# Patient Record
Sex: FEMALE | Race: BLACK OR AFRICAN AMERICAN | Employment: OTHER | URBAN - METROPOLITAN AREA
[De-identification: names, ages, dates, MRNs, and addresses within clinical notes are randomized per-mention and may not be internally consistent; named-entity substitution may affect disease eponyms.]

---

## 2018-10-11 ENCOUNTER — HOSPITAL ENCOUNTER (INPATIENT)
Age: 63
LOS: 2 days | Discharge: HOME | DRG: 247 | End: 2018-10-13
Attending: EMERGENCY MEDICINE | Admitting: INTERNAL MEDICINE
Payer: COMMERCIAL

## 2018-10-11 DIAGNOSIS — R07.89 ATYPICAL CHEST PAIN: ICD-10-CM

## 2018-10-11 DIAGNOSIS — R77.8 ELEVATED TROPONIN: Primary | ICD-10-CM

## 2018-10-11 DIAGNOSIS — R07.9 CHEST PAIN: ICD-10-CM

## 2018-10-11 LAB
ALBUMIN SERPL-MCNC: 4 G/DL (ref 3.4–5)
ALBUMIN/GLOB SERPL: 1.1 {RATIO} (ref 0.8–1.7)
ALP SERPL-CCNC: 70 U/L (ref 45–117)
ALT SERPL-CCNC: 28 U/L (ref 13–56)
ANION GAP SERPL CALC-SCNC: 7 MMOL/L (ref 3–18)
APTT PPP: 38 SEC (ref 23–36.4)
AST SERPL-CCNC: 71 U/L (ref 15–37)
ATRIAL RATE: 62 BPM
BASOPHILS # BLD: 0 K/UL (ref 0–0.1)
BASOPHILS NFR BLD: 1 % (ref 0–2)
BILIRUB DIRECT SERPL-MCNC: 0.1 MG/DL (ref 0–0.2)
BILIRUB SERPL-MCNC: 0.5 MG/DL (ref 0.2–1)
BUN SERPL-MCNC: 10 MG/DL (ref 7–18)
BUN/CREAT SERPL: 13 (ref 12–20)
CALCIUM SERPL-MCNC: 9.1 MG/DL (ref 8.5–10.1)
CALCULATED P AXIS, ECG09: 40 DEGREES
CALCULATED R AXIS, ECG10: -58 DEGREES
CALCULATED T AXIS, ECG11: 34 DEGREES
CHLORIDE SERPL-SCNC: 102 MMOL/L (ref 100–108)
CK MB CFR SERPL CALC: 12.1 % (ref 0–4)
CK MB SERPL-MCNC: 77.4 NG/ML (ref 5–25)
CK SERPL-CCNC: 638 U/L (ref 26–192)
CO2 SERPL-SCNC: 29 MMOL/L (ref 21–32)
CREAT SERPL-MCNC: 0.78 MG/DL (ref 0.6–1.3)
DIAGNOSIS, 93000: NORMAL
DIFFERENTIAL METHOD BLD: NORMAL
EOSINOPHIL # BLD: 0.1 K/UL (ref 0–0.4)
EOSINOPHIL NFR BLD: 1 % (ref 0–5)
ERYTHROCYTE [DISTWIDTH] IN BLOOD BY AUTOMATED COUNT: 13.4 % (ref 11.6–14.5)
GLOBULIN SER CALC-MCNC: 3.5 G/DL (ref 2–4)
GLUCOSE SERPL-MCNC: 125 MG/DL (ref 74–99)
HCT VFR BLD AUTO: 41 % (ref 35–45)
HGB BLD-MCNC: 13.7 G/DL (ref 12–16)
LIPASE SERPL-CCNC: 92 U/L (ref 73–393)
LYMPHOCYTES # BLD: 1.4 K/UL (ref 0.9–3.6)
LYMPHOCYTES NFR BLD: 23 % (ref 21–52)
MAGNESIUM SERPL-MCNC: 1.6 MG/DL (ref 1.6–2.6)
MCH RBC QN AUTO: 27.2 PG (ref 24–34)
MCHC RBC AUTO-ENTMCNC: 33.4 G/DL (ref 31–37)
MCV RBC AUTO: 81.5 FL (ref 74–97)
MONOCYTES # BLD: 0.4 K/UL (ref 0.05–1.2)
MONOCYTES NFR BLD: 6 % (ref 3–10)
NEUTS SEG # BLD: 4.2 K/UL (ref 1.8–8)
NEUTS SEG NFR BLD: 69 % (ref 40–73)
P-R INTERVAL, ECG05: 216 MS
PLATELET # BLD AUTO: 269 K/UL (ref 135–420)
PMV BLD AUTO: 10.4 FL (ref 9.2–11.8)
POTASSIUM SERPL-SCNC: 3.7 MMOL/L (ref 3.5–5.5)
PROT SERPL-MCNC: 7.5 G/DL (ref 6.4–8.2)
Q-T INTERVAL, ECG07: 444 MS
QRS DURATION, ECG06: 102 MS
QTC CALCULATION (BEZET), ECG08: 450 MS
RBC # BLD AUTO: 5.03 M/UL (ref 4.2–5.3)
SODIUM SERPL-SCNC: 138 MMOL/L (ref 136–145)
TROPONIN I SERPL-MCNC: 0.47 NG/ML (ref 0–0.06)
TROPONIN I SERPL-MCNC: 10.8 NG/ML (ref 0–0.04)
TROPONIN I SERPL-MCNC: 14.5 NG/ML (ref 0–0.04)
VENTRICULAR RATE, ECG03: 62 BPM
WBC # BLD AUTO: 6.1 K/UL (ref 4.6–13.2)

## 2018-10-11 PROCEDURE — 74011250636 HC RX REV CODE- 250/636: Performed by: EMERGENCY MEDICINE

## 2018-10-11 PROCEDURE — 82550 ASSAY OF CK (CPK): CPT | Performed by: PHYSICIAN ASSISTANT

## 2018-10-11 PROCEDURE — 74011250636 HC RX REV CODE- 250/636: Performed by: PHYSICIAN ASSISTANT

## 2018-10-11 PROCEDURE — 84484 ASSAY OF TROPONIN QUANT: CPT | Performed by: EMERGENCY MEDICINE

## 2018-10-11 PROCEDURE — 65660000000 HC RM CCU STEPDOWN

## 2018-10-11 PROCEDURE — 74011250637 HC RX REV CODE- 250/637: Performed by: PHYSICIAN ASSISTANT

## 2018-10-11 PROCEDURE — 80048 BASIC METABOLIC PNL TOTAL CA: CPT | Performed by: EMERGENCY MEDICINE

## 2018-10-11 PROCEDURE — 36415 COLL VENOUS BLD VENIPUNCTURE: CPT | Performed by: PHYSICIAN ASSISTANT

## 2018-10-11 PROCEDURE — 80076 HEPATIC FUNCTION PANEL: CPT | Performed by: PHYSICIAN ASSISTANT

## 2018-10-11 PROCEDURE — 74011250637 HC RX REV CODE- 250/637: Performed by: EMERGENCY MEDICINE

## 2018-10-11 PROCEDURE — 74011250637 HC RX REV CODE- 250/637: Performed by: INTERNAL MEDICINE

## 2018-10-11 PROCEDURE — 93005 ELECTROCARDIOGRAM TRACING: CPT

## 2018-10-11 PROCEDURE — 74011250636 HC RX REV CODE- 250/636: Performed by: INTERNAL MEDICINE

## 2018-10-11 PROCEDURE — 85025 COMPLETE CBC W/AUTO DIFF WBC: CPT | Performed by: EMERGENCY MEDICINE

## 2018-10-11 PROCEDURE — 85730 THROMBOPLASTIN TIME PARTIAL: CPT | Performed by: PHYSICIAN ASSISTANT

## 2018-10-11 PROCEDURE — 74011000250 HC RX REV CODE- 250: Performed by: INTERNAL MEDICINE

## 2018-10-11 PROCEDURE — 74011250637 HC RX REV CODE- 250/637: Performed by: HOSPITALIST

## 2018-10-11 PROCEDURE — 99284 EMERGENCY DEPT VISIT MOD MDM: CPT

## 2018-10-11 PROCEDURE — 83735 ASSAY OF MAGNESIUM: CPT | Performed by: EMERGENCY MEDICINE

## 2018-10-11 PROCEDURE — 83690 ASSAY OF LIPASE: CPT | Performed by: PHYSICIAN ASSISTANT

## 2018-10-11 PROCEDURE — 74011000250 HC RX REV CODE- 250: Performed by: PHYSICIAN ASSISTANT

## 2018-10-11 RX ORDER — ENOXAPARIN SODIUM 100 MG/ML
80 INJECTION SUBCUTANEOUS
Status: COMPLETED | OUTPATIENT
Start: 2018-10-11 | End: 2018-10-11

## 2018-10-11 RX ORDER — GLUCOSAM/CHONDRO/HERB 149/HYAL 750-100 MG
1 TABLET ORAL DAILY
COMMUNITY

## 2018-10-11 RX ORDER — LORAZEPAM 1 MG/1
1 TABLET ORAL
Status: DISCONTINUED | OUTPATIENT
Start: 2018-10-11 | End: 2018-10-13 | Stop reason: HOSPADM

## 2018-10-11 RX ORDER — GUAIFENESIN 100 MG/5ML
81 LIQUID (ML) ORAL DAILY
Status: DISCONTINUED | OUTPATIENT
Start: 2018-10-11 | End: 2018-10-13 | Stop reason: HOSPADM

## 2018-10-11 RX ORDER — ENOXAPARIN SODIUM 100 MG/ML
1 INJECTION SUBCUTANEOUS EVERY 12 HOURS
Status: DISCONTINUED | OUTPATIENT
Start: 2018-10-11 | End: 2018-10-11

## 2018-10-11 RX ORDER — HYDROCHLOROTHIAZIDE 25 MG/1
12.5 TABLET ORAL DAILY
Status: DISCONTINUED | OUTPATIENT
Start: 2018-10-11 | End: 2018-10-13 | Stop reason: HOSPADM

## 2018-10-11 RX ORDER — ONDANSETRON 2 MG/ML
4 INJECTION INTRAMUSCULAR; INTRAVENOUS
Status: DISCONTINUED | OUTPATIENT
Start: 2018-10-11 | End: 2018-10-12

## 2018-10-11 RX ORDER — LOSARTAN POTASSIUM AND HYDROCHLOROTHIAZIDE 12.5; 5 MG/1; MG/1
1 TABLET ORAL DAILY
COMMUNITY
End: 2019-07-22

## 2018-10-11 RX ORDER — GUAIFENESIN 100 MG/5ML
243 LIQUID (ML) ORAL
Status: COMPLETED | OUTPATIENT
Start: 2018-10-11 | End: 2018-10-11

## 2018-10-11 RX ORDER — FAMOTIDINE 20 MG/50ML
20 INJECTION, SOLUTION INTRAVENOUS EVERY 12 HOURS
Status: DISCONTINUED | OUTPATIENT
Start: 2018-10-11 | End: 2018-10-11

## 2018-10-11 RX ORDER — CARVEDILOL 3.12 MG/1
3.12 TABLET ORAL EVERY 12 HOURS
Status: DISCONTINUED | OUTPATIENT
Start: 2018-10-11 | End: 2018-10-11

## 2018-10-11 RX ORDER — HEPARIN SODIUM 10000 [USP'U]/100ML
12-25 INJECTION, SOLUTION INTRAVENOUS
Status: DISCONTINUED | OUTPATIENT
Start: 2018-10-11 | End: 2018-10-12

## 2018-10-11 RX ORDER — ATORVASTATIN CALCIUM 40 MG/1
80 TABLET, FILM COATED ORAL
Status: DISCONTINUED | OUTPATIENT
Start: 2018-10-11 | End: 2018-10-13 | Stop reason: HOSPADM

## 2018-10-11 RX ORDER — ACETAMINOPHEN 325 MG/1
650 TABLET ORAL
Status: DISCONTINUED | OUTPATIENT
Start: 2018-10-11 | End: 2018-10-13

## 2018-10-11 RX ORDER — NITROGLYCERIN 40 MG/100ML
10 INJECTION INTRAVENOUS CONTINUOUS
Status: DISCONTINUED | OUTPATIENT
Start: 2018-10-11 | End: 2018-10-13

## 2018-10-11 RX ORDER — LOSARTAN POTASSIUM 50 MG/1
50 TABLET ORAL DAILY
Status: DISCONTINUED | OUTPATIENT
Start: 2018-10-11 | End: 2018-10-13 | Stop reason: HOSPADM

## 2018-10-11 RX ORDER — HEPARIN SODIUM 1000 [USP'U]/ML
55.1 INJECTION, SOLUTION INTRAVENOUS; SUBCUTANEOUS ONCE
Status: COMPLETED | OUTPATIENT
Start: 2018-10-11 | End: 2018-10-11

## 2018-10-11 RX ORDER — PRAVASTATIN SODIUM 20 MG/1
40 TABLET ORAL
Status: DISCONTINUED | OUTPATIENT
Start: 2018-10-11 | End: 2018-10-11

## 2018-10-11 RX ORDER — METOPROLOL TARTRATE 25 MG/1
25 TABLET, FILM COATED ORAL EVERY 12 HOURS
Status: DISCONTINUED | OUTPATIENT
Start: 2018-10-11 | End: 2018-10-12

## 2018-10-11 RX ORDER — ENOXAPARIN SODIUM 100 MG/ML
1 INJECTION SUBCUTANEOUS EVERY 12 HOURS
Status: DISCONTINUED | OUTPATIENT
Start: 2018-10-12 | End: 2018-10-11

## 2018-10-11 RX ORDER — HYDRALAZINE HYDROCHLORIDE 20 MG/ML
10 INJECTION INTRAMUSCULAR; INTRAVENOUS
Status: DISCONTINUED | OUTPATIENT
Start: 2018-10-11 | End: 2018-10-13 | Stop reason: HOSPADM

## 2018-10-11 RX ORDER — VENLAFAXINE 37.5 MG/1
37.5 TABLET ORAL 3 TIMES DAILY
Status: DISCONTINUED | OUTPATIENT
Start: 2018-10-11 | End: 2018-10-13 | Stop reason: HOSPADM

## 2018-10-11 RX ORDER — GLUCOSAM/CHONDRO/HERB 149/HYAL 750-100 MG
1 TABLET ORAL DAILY
Status: DISCONTINUED | OUTPATIENT
Start: 2018-10-12 | End: 2018-10-13 | Stop reason: HOSPADM

## 2018-10-11 RX ORDER — VENLAFAXINE 37.5 MG/1
35.5 TABLET ORAL DAILY
COMMUNITY

## 2018-10-11 RX ORDER — PRAVASTATIN SODIUM 40 MG/1
40 TABLET ORAL
COMMUNITY
End: 2018-10-13

## 2018-10-11 RX ADMIN — VENLAFAXINE 37.5 MG: 37.5 TABLET ORAL at 18:56

## 2018-10-11 RX ADMIN — VENLAFAXINE 37.5 MG: 37.5 TABLET ORAL at 22:15

## 2018-10-11 RX ADMIN — ASPIRIN 81 MG CHEWABLE TABLET 81 MG: 81 TABLET CHEWABLE at 10:45

## 2018-10-11 RX ADMIN — ATORVASTATIN CALCIUM 80 MG: 40 TABLET, FILM COATED ORAL at 22:15

## 2018-10-11 RX ADMIN — LORAZEPAM 1 MG: 1 TABLET ORAL at 22:24

## 2018-10-11 RX ADMIN — HEPARIN SODIUM AND DEXTROSE 871.2 UNITS/HR: 10000; 5 INJECTION INTRAVENOUS at 21:22

## 2018-10-11 RX ADMIN — HYDROCHLOROTHIAZIDE 12.5 MG: 25 TABLET ORAL at 16:49

## 2018-10-11 RX ADMIN — ONDANSETRON HYDROCHLORIDE 4 MG: 2 SOLUTION INTRAMUSCULAR; INTRAVENOUS at 23:59

## 2018-10-11 RX ADMIN — NITROGLYCERIN 10 MCG/MIN: 40 INJECTION INTRAVENOUS at 22:18

## 2018-10-11 RX ADMIN — LOSARTAN POTASSIUM 50 MG: 50 TABLET ORAL at 16:49

## 2018-10-11 RX ADMIN — HYDRALAZINE HYDROCHLORIDE 10 MG: 20 INJECTION INTRAMUSCULAR; INTRAVENOUS at 19:46

## 2018-10-11 RX ADMIN — HEPARIN SODIUM 4000 UNITS: 1000 INJECTION, SOLUTION INTRAVENOUS; SUBCUTANEOUS at 21:23

## 2018-10-11 RX ADMIN — ASPIRIN 81 MG CHEWABLE TABLET 243 MG: 81 TABLET CHEWABLE at 18:56

## 2018-10-11 RX ADMIN — ENOXAPARIN SODIUM 80 MG: 100 INJECTION SUBCUTANEOUS at 12:36

## 2018-10-11 RX ADMIN — SODIUM CHLORIDE 20 MG: 9 INJECTION INTRAMUSCULAR; INTRAVENOUS; SUBCUTANEOUS at 21:25

## 2018-10-11 RX ADMIN — METOPROLOL TARTRATE 25 MG: 25 TABLET ORAL at 22:20

## 2018-10-11 NOTE — Clinical Note
Right groin and right radial clipped, prepped with ChloraPrep and draped. Wet prep solution applied at: 939. Wet prep solution dried at: 942. Wet prep elapsed drying time: 3 mins.

## 2018-10-11 NOTE — Clinical Note
Lesion 1. Balloon inserted. Balloon inflated using multiple inflations. Lesion 1: Pressure = 8 dionne; Duration = 7 sec. Inflation 2: Pressure = 10 dionne; Duration = 6 sec. Inflation 3: Pressure = 10 dionne; Duration = 4 sec.

## 2018-10-11 NOTE — Clinical Note
Contrast Dose Calculator:  
Patient's age: 59.  
Patient's sex: Female. Patient weight (kg) = 72.3. Creatinine level (mg/dL) = 0.85. Creatinine clearance (mL/min): 78.  
Contrast concentration (mg/mL) = 300. MACD = 300 mL. Max Contrast dose per Creatinine Cl calculator = 175.5 mL.

## 2018-10-11 NOTE — Clinical Note
TRANSFER - IN REPORT:  
 
Verbal report received from: [facility], [facility], RN . Report consisted of patient's Situation, Background, Assessment and  
Recommendations(SBAR). Opportunity for questions and clarification was provided. Assessment completed upon patient's arrival to unit and care assumed. Patient transported with a Cardiac Cath Tech / Patient Care Tech.

## 2018-10-11 NOTE — IP AVS SNAPSHOT
[address] Patient: Roe Harper MRN: [id number] WANDER:[birth year] About your hospitalization You were admitted on:  October 11, 2018 You last received care in the:  [address] [place] [address] You were discharged on:  October 13, 2018 Why you were hospitalized Your primary diagnosis was:  Not on File Your diagnoses also included:  Atypical Chest Pain, Elevated Troponin Follow-up Information Follow up With Details Comments Contact Info Zoë Alfaro MD On 11/1/2018 F/u @ 10:30amwith [facility] [place] [place] [address] [facility] [address] [facility] [address] 
[phone] Ragini Clinton MD On 10/18/2018 F/u @11:30am must be there by 11:15am [address] [facility] [address] [facility] [address] 
[phone] Connie Jain, MD   Patient can only remember the practice name and not the physician Discharge Orders None A check travon indicates which time of day the medication should be taken. My Medications START taking these medications Instructions Each Dose to Equal  
 Morning Noon Evening Bedtime  
 acetaminophen 500 mg tablet Commonly known as:  TYLENOL Take 1 Tab by mouth every six (6) hours as needed. 500 mg  
    
   
   
   
  
 aluminum-magnesium hydroxide 200-200 mg/5 mL suspension Commonly known as:  MAALOX Take 15 mL by mouth three (3) times daily (with meals) for 5 days. 15 mL  
    
  
   
  
   
  
   
  
 aspirin 81 mg chewable tablet Start taking on:  10/14/2018 Take 1 Tab by mouth daily. 81 mg  
    
  
   
   
   
  
 atorvastatin 80 mg tablet Commonly known as:  LIPITOR Take 1 Tab by mouth nightly. 80 mg  
    
   
   
  
   
  
 isosorbide mononitrate ER 30 mg tablet Commonly known as:  IMDUR Start taking on:  10/14/2018 Take 1 Tab by mouth daily.   
 30 mg  
    
  
   
   
   
  
 metoprolol succinate 50 mg XL tablet Commonly known as:  TOPROL-XL Start taking on:  10/14/2018 Take 1 Tab by mouth daily. 50 mg  
    
  
   
   
   
  
 OTHER This is to certify that family member for Peter Patel is currently admitted to [facility] and Danielle Simmons has been here at the hospital on 10/12/2018  Please feel free to contact my office if you have any questions or concerns. Thank you. pantoprazole 40 mg tablet Commonly known as:  PROTONIX Take one tablet by mouth before breakfast and dinner daily for two days an then daily before dinner Before breakfast and dinner for two days, then daily before dinner. polyethylene glycol 17 gram packet Commonly known as:  Hanane Kimberlee Take 1 Packet by mouth daily as needed (constipation). 17 g  
    
   
   
   
  
 ticagrelor 90 mg tablet Commonly known as:  [employer] Take 1 Tab by mouth every twelve (12) hours every twelve (12) hours. 90 mg CONTINUE taking these medications Instructions Each Dose to Equal  
 Morning Noon Evening Bedtime Cetirizine 10 mg Cap Take  by mouth.  
     
   
   
   
  
 losartan-hydroCHLOROthiazide 50-12.5 mg per tablet Commonly known as:  HYZAAR Take 1 Tab by mouth daily. 1 Tab  
    
  
   
   
   
  
 omega 3-DHA-EPA-fish oil 1,000 mg (120 mg-180 mg) capsule Take 1 Cap by mouth daily. 1 Cap  
    
  
   
   
   
  
 venlafaxine 37.5 mg tablet Commonly known as:  [facility] Take 37.5 mg by mouth three (3) times daily. 37.5 mg  
    
  
   
  
   
  
   
  
  
STOP taking these medications   
 pravastatin 40 mg tablet Commonly known as:  PRAVACHOL Where to Get Your Medications Information on where to get these meds will be given to you by the nurse or doctor. ! Ask your nurse or doctor about these medications acetaminophen 500 mg tablet  
 aluminum-magnesium hydroxide 200-200 mg/5 mL suspension  
 aspirin 81 mg chewable tablet  
 atorvastatin 80 mg tablet  
 isosorbide mononitrate ER 30 mg tablet  
 metoprolol succinate 50 mg XL tablet OTHER  
 pantoprazole 40 mg tablet  
 polyethylene glycol 17 gram packet  
 ticagrelor 90 mg tablet Discharge Instructions Chest Pain: Care Instructions Your Care Instructions There are many things that can cause chest pain. Some are not serious and will get better on their own in a few days. But some kinds of chest pain need more testing and treatment. Your doctor may have recommended a follow-up visit in the next 8 to 12 hours. If you are not getting better, you may need more tests or treatment. Even though your doctor has released you, you still need to watch for any problems. The doctor carefully checked you, but sometimes problems can develop later. If you have new symptoms or if your symptoms do not get better, get medical care right away. If you have worse or different chest pain or pressure that lasts more than 5 minutes or you passed out (lost consciousness), call 911 or seek other emergency help right away. A medical visit is only one step in your treatment. Even if you feel better, you still need to do what your doctor recommends, such as going to all suggested follow-up appointments and taking medicines exactly as directed. This will help you recover and help prevent future problems. How can you care for yourself at home? · Rest until you feel better. · Take your medicine exactly as prescribed. Call your doctor if you think you are having a problem with your medicine. · Do not drive after taking a prescription pain medicine. When should you call for help? Call 911 if: 
  · You passed out (lost consciousness).  
  · You have severe difficulty breathing.  
  · You have symptoms of a heart attack. These may include: ¨ Chest pain or pressure, or a strange feeling in your chest. 
¨ Sweating. ¨ Shortness of breath. ¨ Nausea or vomiting. ¨ Pain, pressure, or a strange feeling in your back, neck, jaw, or upper belly or in one or both shoulders or arms. ¨ Lightheadedness or sudden weakness. ¨ A fast or irregular heartbeat. After you call 911, the  may tell you to chew 1 adult-strength or 2 to 4 low-dose aspirin. Wait for an ambulance. Do not try to drive yourself.  
 Call your doctor today if: 
  · You have any trouble breathing.  
  · Your chest pain gets worse.  
  · You are dizzy or lightheaded, or you feel like you may faint.  
  · You are not getting better as expected.  
  · You are having new or different chest pain. Where can you learn more? Go to http://mauricio-pablo.info/. Enter A120 in the search box to learn more about \"Chest Pain: Care Instructions. \" Current as of: November 20, 2017 Content Version: 11.8 © 2662-2080 [employer]. Care instructions adapted under license by [employer] (which disclaims liability or warranty for this information). If you have questions about a medical condition or this instruction, always ask your healthcare professional. [address] any warranty or liability for your use of this information. Introducing [facility]! [facility] introduces [employer] patient portal. Now you can access parts of your medical record, email your doctor's office, and request medication refills online. 1. In your internet browser, go to [email or web address] 2. Click on the First Time User? Click Here link in the Sign In box. You will see the New Member Sign Up page. 3. Enter your [employer] Access Code exactly as it appears below. You will not need to use this code after youve completed the sign-up process. If you do not sign up before the expiration date, you must request a new code. · BrightLine Access Code: [email or web address] Expires: 1/9/2019  9:07 AM 
 
4. Enter the last four digits of your Social Security Number (xxxx) and Date of Birth (mm/dd/yyyy) as indicated and click Submit. You will be taken to the next sign-up page. 5. Create a BrightLine ID. This will be your BrightLine login ID and cannot be changed, so think of one that is secure and easy to remember. 6. Create a BrightLine password. You can change your password at any time. 7. Enter your Password Reset Question and Answer. This can be used at a later time if you forget your password. 8. Enter your e-mail address. You will receive e-mail notification when new information is available in [address] 9. Click Sign Up. You can now view and download portions of your medical record. 10. Click the Download Summary menu link to download a portable copy of your medical information. If you have questions, please visit the Frequently Asked Questions section of the BrightLine website. Remember, BrightLine is NOT to be used for urgent needs. For medical emergencies, dial 911. Now available from your iPhone and Android! Introducing Raj Narvaez As a [facility] patient, I wanted to make you aware of our electronic visit tool called Raj Eduardonitajuan. [facility] 24/7 allows you to connect within minutes with a medical provider 24 hours a day, seven days a week via a mobile device or tablet or logging into a secure website from your computer. You can access Raj Narvaez from anywhere in the [place]. A virtual visit might be right for you when you have a simple condition and feel like you just dont want to get out of bed, or cant get away from work for an appointment, when your regular [facility] provider is not available (evenings, weekends or holidays), or when youre out of town and need minor care.   Electronic visits cost only $49 and if the [facility] 24/7 provider determines a prescription is needed to treat your condition, one can be electronically transmitted to a nearby pharmacy*. Please take a moment to enroll today if you have not already done so. The enrollment process is free and takes just a few minutes. To enroll, please download the [employer] 24/7 murtaza to your tablet or phone, or visit [email or web address] to enroll on your computer. And, as an [address] [place] [address] patient with a [employer] account, the results of your visits will be scanned into your electronic medical record and your primary care provider will be able to view the scanned results. We urge you to continue to see your regular [employer] provider for your ongoing medical care. And while your primary care provider may not be the one available when you seek a List[employer] virtual visit, the peace of mind you get from getting a real diagnosis real time can be priceless. For more information on ListMinut, view our Frequently Asked Questions (FAQs) at [email or web address]. Sincerely, 
 
Marisel Arriaga MD 
Chief Medical Officer [facility] *:  certain medications cannot be prescribed via ListMinut Providers Seen During Your Hospitalization Provider Specialty Primary office phone Liv Simons MD Emergency Medicine [phone] Katherine Trammell MD Internal Medicine [phone] Monica Demarco MD Internal Medicine [phone] Immunizations Administered for This Admission Name Date Influenza Vaccine (Quad) PF  Deferred () Your Primary Care Physician (PCP) Primary Care Physician Office Phone Office Fax OTHER, PHYS ** None ** ** None ** You are allergic to the following No active allergies Recent Documentation Height Weight Breastfeeding? BMI OB Status Smoking Status 1.549 m 71.7 kg No 29.85 kg/m2 Hysterectomy Never Smoker Emergency Contacts Name Discharge Info Relation Home Work Mobile Norma Marie CAREGIVER [3] Spouse [3] [phone] Patient Belongings The following personal items are in your possession at time of discharge: 
  Dental Appliances: None  Visual Aid: None      Home Medications: Sent home   Jewelry: Earrings, Bracelet, Necklace  Clothing: Pants, Undergarments, With patient    Other Valuables: Sent home Please provide this summary of care documentation to your next provider. Signatures-by signing, you are acknowledging that this After Visit Summary has been reviewed with you and you have received a copy. Patient Signature:  ____________________________________________________________ Date:  ____________________________________________________________  
  
[facility] Provider Signature:  ____________________________________________________________ Date:  ____________________________________________________________

## 2018-10-11 NOTE — ED TRIAGE NOTES
Patient c/o onset of epigastric pain today at 0515. She states prior hx of epigastric pain on Monday at approximately same time. Patient appears anxious.

## 2018-10-11 NOTE — ED PROVIDER NOTES
EMERGENCY DEPARTMENT HISTORY AND PHYSICAL EXAM 
 
9:30 AM 
 
 
Date: 10/11/2018 Patient Name: Janak Kulkarni History of Presenting Illness Chief Complaint Patient presents with  Abdominal Pain  
  epigastric History Provided By: Patient Chief Complaint: Abdominal Pain Duration: 3 Days Timing:  Intermittent Location: Generalized Quality: Bloating Severity: Moderate Modifying Factors: Nothing makes the Sx better or worse. Associated Symptoms: neck pain (left sided), vomiting Additional History (Context): Janak Kulkarni is a 59 y.o. female with PMHx of HTN, high cholesterol, headache who presents with intermittent moderate generalized abdominal pain described as a bloating feeling that started x 3 days ago. Associated Sx include neck pain (left sided) and vomiting. Nothing makes the Sx better or worse. Pt states the Sx have subsided at bedside. Reports she ate a sandwich from [employer] x 3 days ago. Denies any further complaints or symptoms at the moment. PCP: Connie Jain MD 
 
Current Facility-Administered Medications Medication Dose Route Frequency Provider Last Rate Last Dose  aspirin chewable tablet 81 mg  81 mg Oral DAILY Dinora Ferrell MD   81 mg at 10/11/18 1045 Current Outpatient Prescriptions Medication Sig Dispense Refill  losartan-hydroCHLOROthiazide (HYZAAR) 50-12.5 mg per tablet Take 1 Tab by mouth daily.  venlafaxine (EFFEXOR) 37.5 mg tablet Take 37.5 mg by mouth three (3) times daily.  omega 3-DHA-EPA-fish oil 1,000 mg (120 mg-180 mg) capsule Take 1 Cap by mouth daily.  pravastatin (PRAVACHOL) 40 mg tablet Take 40 mg by mouth nightly.  Cetirizine 10 mg cap Take  by mouth. Past History Past Medical History: 
Past Medical History:  
Diagnosis Date  Headache  High cholesterol  Hypertension Past Surgical History: 
Past Surgical History:  
Procedure Laterality Date  HX OTHER SURGICAL    
 right fallopian tube removal  
 HX PARTIAL HYSTERECTOMY Family History: 
History reviewed. No pertinent family history. Social History: 
Social History Substance Use Topics  Smoking status: Never Smoker  Smokeless tobacco: Never Used  Alcohol use Yes Comment: occasional use Allergies: 
No Known Allergies Review of Systems Review of Systems Constitutional: Negative for chills, fatigue and fever. HENT: Negative. Negative for sore throat. Eyes: Negative. Respiratory: Negative for cough and shortness of breath. Cardiovascular: Negative for chest pain and palpitations. Gastrointestinal: Positive for abdominal pain and vomiting. Negative for nausea. Genitourinary: Negative for dysuria. Musculoskeletal: Positive for neck pain. Skin: Negative. Neurological: Negative for dizziness, weakness, light-headedness and headaches. Psychiatric/Behavioral: Negative. All other systems reviewed and are negative. Physical Exam  
 
Visit Vitals  BP (!) 194/117 (BP 1 Location: Left arm, BP Patient Position: At rest)  Pulse 68  Temp 98.5 °F (36.9 °C)  Resp 16  
 Ht 5' 1\" (1.549 m)  Wt 72.6 kg (160 lb)  SpO2 100%  BMI 30.23 kg/m2 Physical Exam  
Constitutional: She is oriented to person, place, and time. She appears well-developed and well-nourished. HENT:  
Head: Normocephalic and atraumatic. Mouth/Throat: Oropharynx is clear and moist.  
Eyes: Conjunctivae are normal. Pupils are equal, round, and reactive to light. No scleral icterus. Neck: Normal range of motion. Neck supple. No JVD present. No tracheal deviation present. Cardiovascular: Normal rate, regular rhythm and normal heart sounds. Pulmonary/Chest: Effort normal and breath sounds normal. No respiratory distress. She has no wheezes. Abdominal: Soft. Bowel sounds are normal.  
Musculoskeletal: Normal range of motion. Neurological: She is alert and oriented to person, place, and time. She has normal strength. Gait normal. GCS eye subscore is 4. GCS verbal subscore is 5. GCS motor subscore is 6. Skin: Skin is warm and dry. Psychiatric: She has a normal mood and affect. Nursing note and vitals reviewed. Diagnostic Study Results Labs - Recent Results (from the past 12 hour(s)) EKG, 12 LEAD, INITIAL Collection Time: 10/11/18  9:15 AM  
Result Value Ref Range Ventricular Rate 62 BPM  
 Atrial Rate 62 BPM  
 P-R Interval 216 ms  
 QRS Duration 102 ms Q-T Interval 444 ms QTC Calculation (Bezet) 450 ms Calculated P Axis 40 degrees Calculated R Axis -58 degrees Calculated T Axis 34 degrees Diagnosis Sinus rhythm with 1st degree AV block Incomplete right bundle branch block Left anterior fascicular block Voltage criteria for left ventricular hypertrophy Abnormal ECG No previous ECGs available METABOLIC PANEL, BASIC Collection Time: 10/11/18  9:45 AM  
Result Value Ref Range Sodium 138 136 - 145 mmol/L Potassium 3.7 3.5 - 5.5 mmol/L Chloride 102 100 - 108 mmol/L  
 CO2 29 21 - 32 mmol/L Anion gap 7 3.0 - 18 mmol/L Glucose 125 (H) 74 - 99 mg/dL BUN 10 7.0 - 18 MG/DL Creatinine 0.78 0.6 - 1.3 MG/DL  
 BUN/Creatinine ratio 13 12 - 20 GFR est AA >60 >60 ml/min/1.73m2 GFR est non-AA >60 >60 ml/min/1.73m2 Calcium 9.1 8.5 - 10.1 MG/DL  
CBC WITH AUTOMATED DIFF Collection Time: 10/11/18  9:45 AM  
Result Value Ref Range WBC 6.1 4.6 - 13.2 K/uL  
 RBC 5.03 4.20 - 5.30 M/uL  
 HGB 13.7 12.0 - 16.0 g/dL HCT 41.0 35.0 - 45.0 % MCV 81.5 74.0 - 97.0 FL  
 MCH 27.2 24.0 - 34.0 PG  
 MCHC 33.4 31.0 - 37.0 g/dL  
 RDW 13.4 11.6 - 14.5 % PLATELET 711 785 - 641 K/uL MPV 10.4 9.2 - 11.8 FL  
 NEUTROPHILS 69 40 - 73 % LYMPHOCYTES 23 21 - 52 % MONOCYTES 6 3 - 10 % EOSINOPHILS 1 0 - 5 % BASOPHILS 1 0 - 2 % ABS. NEUTROPHILS 4.2 1.8 - 8.0 K/UL  
 ABS. LYMPHOCYTES 1.4 0.9 - 3.6 K/UL  
 ABS. MONOCYTES 0.4 0.05 - 1.2 K/UL  
 ABS. EOSINOPHILS 0.1 0.0 - 0.4 K/UL  
 ABS. BASOPHILS 0.0 0.0 - 0.1 K/UL  
 DF AUTOMATED    
TROPONIN I Collection Time: 10/11/18  9:45 AM  
Result Value Ref Range Troponin-I, Qt. 0.47 (H) 0.00 - 0.06 NG/ML  
MAGNESIUM Collection Time: 10/11/18  9:45 AM  
Result Value Ref Range Magnesium 1.6 1.6 - 2.6 mg/dL Radiologic Studies - No orders to display Medical Decision Making I am the first provider for this patient. I reviewed the vital signs, available nursing notes, past medical history, past surgical history, family history and social history. Vital Signs-Reviewed the patient's vital signs. Pulse Oximetry Analysis - 100 % on RA, normal  
 
EKG: Interpreted by the EP. Time Interpreted: 9:15 AM  
 Rate: 62 bpm  
 Rhythm: NSR Interpretation: no acute changes Records Reviewed: Nursing Notes (Time of Review: 9:30 AM) ED Course: Progress Notes, Reevaluation, and Consults: 
 
11:40 AM Consult: I discussed care with Dr. Beverly Lynn (Hospitalist). It was a standard discussion including patient history, chief complaint, available diagnostic results, and predicted treatment course. Agrees to accept pt for admission. Provider Notes (Medical Decision Making):  
 
 
Diagnosis Clinical Impression: No diagnosis found. Disposition: admitted. Follow-up Information None Patient's Medications Start Taking No medications on file Continue Taking CETIRIZINE 10 MG CAP    Take  by mouth. LOSARTAN-HYDROCHLOROTHIAZIDE (HYZAAR) 50-12.5 MG PER TABLET    Take 1 Tab by mouth daily. OMEGA 3-DHA-EPA-FISH OIL 1,000 MG (120 MG-180 MG) CAPSULE    Take 1 Cap by mouth daily. PRAVASTATIN (PRAVACHOL) 40 MG TABLET    Take 40 mg by mouth nightly. VENLAFAXINE (EFFEXOR) 37.5 MG TABLET    Take 37.5 mg by mouth three (3) times daily. These Medications have changed No medications on file Stop Taking No medications on file  
 
_______________________________ Attestations: 
Scribe Attestation Frankie Solorzano (Aj) acting as a scribe for and in the presence of Oracio Bearden MD     
October 11, 2018 at 9:30 AM 
    
Provider Attestation:     
I personally performed the services described in the documentation, reviewed the documentation, as recorded by the scribe in my presence, and it accurately and completely records my words and actions.  October 11, 2018 at 9:30 AM - Oracio Bearden MD   
_______________________________ 
 
PT resting comfortably, remains asymptomatic, results reviewed with her and  and they agree with admit to [facility] for further Ivania Powell MD 
11:57 AM

## 2018-10-11 NOTE — Clinical Note
TRANSFER - OUT REPORT:  
 
Verbal report given to: Irma Charles RN. Report consisted of patient's Situation, Background, Assessment and  
Recommendations(SBAR). Opportunity for questions and clarification was provided. Patient transported with a Cardiac Cath Tech / Patient Care Tech. Patient transported to: [address].

## 2018-10-11 NOTE — PROGRESS NOTES
Critical troponin lab noted for patient. Doctor Jaclyn Black called and message left with his office . Dr. Carmen Maldonado paged about patient lab result. Spoke to the PA with Cardiology about the labs. New orders entered for patient by the PA. Spoke to pharmacy to clarify the baby aspirin order. Pharmacist paged the PA to clarify.

## 2018-10-11 NOTE — H&P
History and Physical 
NAME:  Dawit Jimenez :   1955 MRN:   [id number] Date/Time:  10/11/2018 CHIEF COMPLAINT: epigastric pain HISTORY OF PRESENT ILLNESS:    
Ms. Elsa Wong is a 59 y.o.   female with a PMH of HTN, hyperlipidemia who presents with c/c of epigastric and lower chest pain. she feels indigestion earlier. She went to [facility] ED. Her labs showed troponin of 0.47. EKG is ok. Cardiology was consulted and transferred to [facility] for further evaluation and management. She denies palpitation or shortness of breathing. Denies cough or fever. She denies any prior cardiac problems. No previous stress test or cardiac evaluation. Past Medical History:  
Diagnosis Date  Headache  High cholesterol  Hypertension Past Surgical History:  
Procedure Laterality Date  HX OTHER SURGICAL    
 right fallopian tube removal  
 HX PARTIAL HYSTERECTOMY Social History Substance Use Topics  Smoking status: Never Smoker  Smokeless tobacco: Never Used  Alcohol use Yes Comment: occasional use History reviewed. No pertinent family history. No Known Allergies Prior to Admission medications Medication Sig Start Date End Date Taking? Authorizing Provider  
losartan-hydroCHLOROthiazide (HYZAAR) 50-12.5 mg per tablet Take 1 Tab by mouth daily. Yes Connie Jain MD  
venlafaxine (EFFEXOR) 37.5 mg tablet Take 37.5 mg by mouth three (3) times daily. Yes Connie Jain MD  
omega 3-DHA-EPA-fish oil 1,000 mg (120 mg-180 mg) capsule Take 1 Cap by mouth daily. Yes Connie Jain MD  
pravastatin (PRAVACHOL) 40 mg tablet Take 40 mg by mouth nightly. Connie Jain MD  
Cetirizine 10 mg cap Take  by mouth. Yes Connie Jain MD  
 
 
REVIEW OF SYSTEMS:  
 
CONSTITUTIONAL: No Fever, No chills, No weight loss, No Night sweats HEENT:  No epistaxis, No diff in swallowing CVS: chest pain, No palpitations, No syncope, No peripheral edema, No PND, No orthopnea RS: No shortness of breath, No cough, No hemoptysis, No pleuritic chest pain GI: abd pain, No vomitting, No diarrhea, No hematemesis, No rectal bleeding, No acid reflux or heartburn NEURO: No focal weakness, No headaches, No seizures PSYCH: No anxiety, No depression MUSCULOSKLETAL: No joint pain or swelling : No hematuria or dysuria SKIN: No rash Physical Exam: VITALS:   
Vital signs reviewed; most recent are: 
 
Visit Vitals  BP (!) 175/97 (BP 1 Location: Right arm, BP Patient Position: At rest)  Pulse 62  Temp 98.5 °F (36.9 °C)  Resp 16  
 Ht 5' 1\" (1.549 m)  Wt 72.6 kg (160 lb)  SpO2 99%  BMI 30.23 kg/m2 SpO2 Readings from Last 6 Encounters:  
10/11/18 99% No intake or output data in the 24 hours ending 10/11/18 1619 GENERAL: Not in acute distress HEENT: pink conjunctiva, un icteric sclera, NECK: No lymphadenopthy or thyroid swelling, JVD not seen LYMPH: No supraclavicular or cervical or axillary nodes on both sides CVS: S1S2, No murmurs, No gallop or rub RS: CTA, No wheezing or crackles Abd: Soft, non tender, not distended, No guarding, No rigidity NEURO:  No focal neurologic deficits Extrm: no leg edema or swelling Skin: No rash Labs: 
Recent Results (from the past 24 hour(s)) EKG, 12 LEAD, INITIAL Collection Time: 10/11/18  9:15 AM  
Result Value Ref Range Ventricular Rate 62 BPM  
 Atrial Rate 62 BPM  
 P-R Interval 216 ms  
 QRS Duration 102 ms Q-T Interval 444 ms QTC Calculation (Bezet) 450 ms Calculated P Axis 40 degrees Calculated R Axis -58 degrees Calculated T Axis 34 degrees Diagnosis Sinus rhythm with 1st degree AV block Incomplete right bundle branch block Left anterior fascicular block Voltage criteria for left ventricular hypertrophy Abnormal ECG No previous ECGs available METABOLIC PANEL, BASIC  
 Collection Time: 10/11/18  9:45 AM  
Result Value Ref Range Sodium 138 136 - 145 mmol/L Potassium 3.7 3.5 - 5.5 mmol/L Chloride 102 100 - 108 mmol/L  
 CO2 29 21 - 32 mmol/L Anion gap 7 3.0 - 18 mmol/L Glucose 125 (H) 74 - 99 mg/dL BUN 10 7.0 - 18 MG/DL Creatinine 0.78 0.6 - 1.3 MG/DL  
 BUN/Creatinine ratio 13 12 - 20 GFR est AA >60 >60 ml/min/1.73m2 GFR est non-AA >60 >60 ml/min/1.73m2 Calcium 9.1 8.5 - 10.1 MG/DL  
CBC WITH AUTOMATED DIFF Collection Time: 10/11/18  9:45 AM  
Result Value Ref Range WBC 6.1 4.6 - 13.2 K/uL  
 RBC 5.03 4.20 - 5.30 M/uL  
 HGB 13.7 12.0 - 16.0 g/dL HCT 41.0 35.0 - 45.0 % MCV 81.5 74.0 - 97.0 FL  
 MCH 27.2 24.0 - 34.0 PG  
 MCHC 33.4 31.0 - 37.0 g/dL  
 RDW 13.4 11.6 - 14.5 % PLATELET 487 835 - 877 K/uL MPV 10.4 9.2 - 11.8 FL  
 NEUTROPHILS 69 40 - 73 % LYMPHOCYTES 23 21 - 52 % MONOCYTES 6 3 - 10 % EOSINOPHILS 1 0 - 5 % BASOPHILS 1 0 - 2 %  
 ABS. NEUTROPHILS 4.2 1.8 - 8.0 K/UL  
 ABS. LYMPHOCYTES 1.4 0.9 - 3.6 K/UL  
 ABS. MONOCYTES 0.4 0.05 - 1.2 K/UL  
 ABS. EOSINOPHILS 0.1 0.0 - 0.4 K/UL  
 ABS. BASOPHILS 0.0 0.0 - 0.1 K/UL  
 DF AUTOMATED    
TROPONIN I Collection Time: 10/11/18  9:45 AM  
Result Value Ref Range Troponin-I, Qt. 0.47 (H) 0.00 - 0.06 NG/ML  
MAGNESIUM Collection Time: 10/11/18  9:45 AM  
Result Value Ref Range Magnesium 1.6 1.6 - 2.6 mg/dL Active Problems: 
  Atypical chest pain (10/11/2018) Elevated troponin (10/11/2018) Assessment: 1. Chest pain 2. Epigastric pain 3. Elevated troponin 4. Hypertensive urgency 5. Hyperlipidemia Plan: · Admit to telemetry unit · Serial cardiac enzymes, EKGs · Cardiology already consuted · Continue ASA, statin, lovenox and ARB. add BB · Full code · D/w patient and family Total time:  57 minutes 
 
        
_______________________________________________________________________ Attending Physician:  Catalino Quintero MD  
 
 
Copies: Aileen Mancini MD

## 2018-10-11 NOTE — Clinical Note
Lesion 1: Stent deployed using reinflated balloon.  Lesion 1: Pressure = 15 dionne; Duration = 7 sec

## 2018-10-11 NOTE — CONSULTS
[facility] - Consult Note    Consultation request by Dr. Roberto Lugo for advice/opinion related to evaluating indeterminate troponin    Date of  Admission: 10/11/2018  9:08 AM   Primary Care Physician:  Connie Jain MD     The patient was seen, examined, and independently evaluated and I agree with the below assessment and plan by Eze Johnson PA-C with the following comments. This lady had an episode of left neck pain as well as pain across her shoulders and in the epigastrium which awoke her from sleep in 2 days ago and again the morning at about 5 AM. She took some herbal tea the first time it happened and had some eructation with the discomfort resolving after about 3-4 hours. She had the same pain this morning prompting her to come to the ER and apparently had the discomfort for a few hours before it was relieved. She denies any other cardiovascular complaints, but her blood pressure was quite elevated when she initially came to the ER in the 190's/110-120 range. Her EKG initially showed nothing acute, but her initial troponin was elevated at 0.47. Agree with Lovenox therapy and following serial EKG's and enzymes with cath vs. Pharm NUC pending her serial testing and clinical course. She does have a high pitched right carotid bruit, so I will also get carotid Dopplers on this lady. Assessment:     -Pt presented due to left-sided neck pain with V x 1 and epigastric pain  -Hypertensive urgency, /117 on presentation, has not received any HTN medications today  -Indeterminate troponin x 1 at 0.47  -Hyperlipidemia, prior intolerance to Pravastatin     Plan:     Pt presented with left-sided neck tightness with epigastric pain and vomiting x 1 this AM.  BP elevated in 190's/110's, has not had HTN medication today.  -Will order pt's home HTN medications to start now. -Will order repeat troponin x 2.   Initial troponin 0.47 but could be related to demand ischemia in setting of markedly elevated BP in 190's/110's. -Will order hepatic function panel and lipase.  -Echocardiogram ordered.  -Pt reports familial hypercholesterolemia, will check fasting lipid panel tomorrow morning. Will check A1c as well. -With indeterminate troponin and risk factors of HTN and hyperlipidemia, will make NPO after midnight for stress test in the morning unless enzymes rise. History of Present Illness: This is a 59 y.o. female admitted for Elevated troponin  Atypical chest pain. Patient complains of:  Epigastric pain, V, neck pain    Yuri Douglas is a 59 y.o. female with Hx HTN, hyperlipidemia who presented to the hospital due to left-sided neck tightness along with epigastric pain and vomiting x 1 that awakened her around 03:30 this morning. She also noted some dryness to her throat. She states that she had an episode 2 days ago around the same time where she was awakened by neck pain and belching. She notes that she has been experiencing some abdominal bloating after eating, which is unchanged. She denies any recent exertional chest pain or dyspnea and states that she has helped paint two rooms recently. She denies any orthopnea. No leg swelling.        Cardiac risk factors: dyslipidemia, hypertension      Review of Symptoms:  Except as stated above include:  Constitutional:  negative  Respiratory:  negative  Cardiovascular:  No exertional chest pain/dyspnea or leg swelling  Gastrointestinal: As per HPI  Genitourinary:  negative  Musculoskeletal:  + left-sided neck tightness  Neurological:  Negative  Dermatological:  Negative  Endocrinological: Negative  Psychological:  Negative       Past Medical History:     Past Medical History:   Diagnosis Date    Headache     High cholesterol     Hypertension          Social History:     Social History     Social History    Marital status: SINGLE     Spouse name: N/A    Number of children: N/A    Years of education: N/A     Social History Main Topics  Smoking status: Never Smoker    Smokeless tobacco: Never Used    Alcohol use Yes      Comment: occasional use    Drug use: No    Sexual activity: Not Asked     Other Topics Concern    None     Social History Narrative    None        Family History:   History reviewed. No pertinent family history. Medications:   No Known Allergies     Current Facility-Administered Medications   Medication Dose Route Frequency    aspirin chewable tablet 81 mg  81 mg Oral DAILY         Physical Exam:     Visit Vitals    /85    Pulse 66    Temp 98.5 °F (36.9 °C)    Resp 14    Ht 5' 1\" (1.549 m)    Wt 160 lb (72.6 kg)    SpO2 100%    BMI 30.23 kg/m2     BP Readings from Last 3 Encounters:   10/11/18 184/85     Pulse Readings from Last 3 Encounters:   10/11/18 66     Wt Readings from Last 3 Encounters:   10/11/18 160 lb (72.6 kg)       General:  alert, cooperative, no distress, appears stated age  Neck:  No JVD. High grade right carotid bruit  Lungs:  clear to auscultation bilaterally  Heart:  Regular rate and rhythm  Abdomen:  abdomen is soft with mild epigastric tenderness  Extremities:  no edema  Skin: Warm and dry.    Neuro: alert, oriented x3, affect appropriate, no focal neurological deficits, moves all extremities well, no involuntary movements  Psych: non focal     Data Review:     Recent Labs      10/11/18   0945   WBC  6.1   HGB  13.7   HCT  41.0   PLT  269     Recent Labs      10/11/18   0945   NA  138   K  3.7   CL  102   CO2  29   GLU  125*   BUN  10   CREA  0.78   CA  9.1   MG  1.6       Results for orders placed or performed during the hospital encounter of 10/11/18   EKG, 12 LEAD, INITIAL   Result Value Ref Range    Ventricular Rate 62 BPM    Atrial Rate 62 BPM    P-R Interval 216 ms    QRS Duration 102 ms    Q-T Interval 444 ms    QTC Calculation (Bezet) 450 ms    Calculated P Axis 40 degrees    Calculated R Axis -58 degrees    Calculated T Axis 34 degrees    Diagnosis       Sinus rhythm with 1st degree AV block  Incomplete right bundle branch block  Left anterior fascicular block  Voltage criteria for left ventricular hypertrophy  Abnormal ECG  No previous ECGs available         All Cardiac Markers in the last 24 hours:    Lab Results   Component Value Date/Time    TROIQ 0.47 (H) 10/11/2018 09:45 AM         Signed By: Julieth Freeman PA-C     October 11, 2018

## 2018-10-12 ENCOUNTER — APPOINTMENT (OUTPATIENT)
Dept: VASCULAR SURGERY | Age: 63
DRG: 247 | End: 2018-10-12
Attending: INTERNAL MEDICINE
Payer: COMMERCIAL

## 2018-10-12 LAB
ANION GAP SERPL CALC-SCNC: 8 MMOL/L (ref 3–18)
APTT PPP: 83.3 SEC (ref 23–36.4)
ATRIAL RATE: 63 BPM
BASOPHILS # BLD: 0 K/UL (ref 0–0.1)
BASOPHILS NFR BLD: 0 % (ref 0–2)
BUN SERPL-MCNC: 8 MG/DL (ref 7–18)
BUN/CREAT SERPL: 9 (ref 12–20)
CALCIUM SERPL-MCNC: 9.1 MG/DL (ref 8.5–10.1)
CALCULATED P AXIS, ECG09: 47 DEGREES
CALCULATED R AXIS, ECG10: -62 DEGREES
CALCULATED T AXIS, ECG11: -67 DEGREES
CHLORIDE SERPL-SCNC: 104 MMOL/L (ref 100–108)
CHOLEST SERPL-MCNC: 339 MG/DL
CO2 SERPL-SCNC: 26 MMOL/L (ref 21–32)
CREAT SERPL-MCNC: 0.85 MG/DL (ref 0.6–1.3)
DIAGNOSIS, 93000: NORMAL
DIFFERENTIAL METHOD BLD: NORMAL
END DIASTOLIC PRESSURE: 14
EOSINOPHIL # BLD: 0 K/UL (ref 0–0.4)
EOSINOPHIL NFR BLD: 1 % (ref 0–5)
ERYTHROCYTE [DISTWIDTH] IN BLOOD BY AUTOMATED COUNT: 13.2 % (ref 11.6–14.5)
EST. AVERAGE GLUCOSE BLD GHB EST-MCNC: 126 MG/DL
GLUCOSE SERPL-MCNC: 133 MG/DL (ref 74–99)
HBA1C MFR BLD: 6 % (ref 4.2–5.6)
HCT VFR BLD AUTO: 38.9 % (ref 35–45)
HDLC SERPL-MCNC: 78 MG/DL (ref 40–60)
HDLC SERPL: 4.3 {RATIO} (ref 0–5)
HGB BLD-MCNC: 13.3 G/DL (ref 12–16)
LDLC SERPL CALC-MCNC: 236.4 MG/DL (ref 0–100)
LEFT CCA DIST DIAS: 15.4 CM/S
LEFT CCA DIST SYS: 51.64 CM/S
LEFT CCA MID DIAS: 15.4 CM/S
LEFT CCA MID SYS: 59.4 CM/S
LEFT CCA PROX DIAS: 22.99 CM/S
LEFT CCA PROX SYS: 111.26 CM/S
LEFT ECA DIAS: 7.69 CM/S
LEFT ECA SYS: 131.86 CM/S
LEFT ICA DIST DIAS: 19.1 CM/S
LEFT ICA DIST SYS: 42.66 CM/S
LEFT ICA MID DIAS: 36.1 CM/S
LEFT ICA MID SYS: 68.46 CM/S
LEFT ICA PROX DIAS: 22.59 CM/S
LEFT ICA PROX SYS: 59.23 CM/S
LEFT OUTFLOW VESSEL EDV: 0 CM/S
LEFT SUBCLAVIAN SYS: 94 CM/S
LEFT VERTEBRAL DIAS: 14.74 CM/S
LEFT VERTEBRAL SYS: 43.53 CM/S
LIPID PROFILE,FLP: ABNORMAL
LYMPHOCYTES # BLD: 2.2 K/UL (ref 0.9–3.6)
LYMPHOCYTES NFR BLD: 27 % (ref 21–52)
MCH RBC QN AUTO: 26.8 PG (ref 24–34)
MCHC RBC AUTO-ENTMCNC: 34.2 G/DL (ref 31–37)
MCV RBC AUTO: 78.3 FL (ref 74–97)
MONOCYTES # BLD: 0.6 K/UL (ref 0.05–1.2)
MONOCYTES NFR BLD: 7 % (ref 3–10)
NEUTS SEG # BLD: 5.3 K/UL (ref 1.8–8)
NEUTS SEG NFR BLD: 65 % (ref 40–73)
P-R INTERVAL, ECG05: 200 MS
PLATELET # BLD AUTO: 340 K/UL (ref 135–420)
PMV BLD AUTO: 10 FL (ref 9.2–11.8)
POTASSIUM SERPL-SCNC: 3.6 MMOL/L (ref 3.5–5.5)
Q-T INTERVAL, ECG07: 500 MS
QRS DURATION, ECG06: 100 MS
QTC CALCULATION (BEZET), ECG08: 511 MS
RBC # BLD AUTO: 4.97 M/UL (ref 4.2–5.3)
RIGHT CCA DIST DIAS: 20.58 CM/S
RIGHT CCA DIST SYS: 72.34 CM/S
RIGHT CCA MID DIAS: 17.99 CM/S
RIGHT CCA MID SYS: 68.46 CM/S
RIGHT CCA PROX DIAS: 19.28 CM/S
RIGHT CCA PROX SYS: 76.22 CM/S
RIGHT ECA DIAS: 22.76 CM/S
RIGHT ECA SYS: 145.51 CM/S
RIGHT ICA DIST DIAS: 34.2 CM/S
RIGHT ICA DIST SYS: 70.28 CM/S
RIGHT ICA MID DIAS: 53.1 CM/S
RIGHT ICA MID SYS: 114.96 CM/S
RIGHT ICA PROX DIAS: 25.65 CM/S
RIGHT ICA PROX SYS: 60.09 CM/S
RIGHT SUBCLAVIAN SYS: 98 CM/S
RIGHT VERTEBRAL DIAS: 15.35 CM/S
RIGHT VERTEBRAL SYS: 51.96 CM/S
SODIUM SERPL-SCNC: 138 MMOL/L (ref 136–145)
TRIGL SERPL-MCNC: 123 MG/DL (ref ?–150)
VENTRICULAR RATE, ECG03: 63 BPM
VLDLC SERPL CALC-MCNC: 24.6 MG/DL
WBC # BLD AUTO: 8.1 K/UL (ref 4.6–13.2)

## 2018-10-12 PROCEDURE — B2151ZZ FLUOROSCOPY OF LEFT HEART USING LOW OSMOLAR CONTRAST: ICD-10-PCS | Performed by: INTERNAL MEDICINE

## 2018-10-12 PROCEDURE — C1894 INTRO/SHEATH, NON-LASER: HCPCS | Performed by: INTERNAL MEDICINE

## 2018-10-12 PROCEDURE — 74011250637 HC RX REV CODE- 250/637: Performed by: INTERNAL MEDICINE

## 2018-10-12 PROCEDURE — 74011250637 HC RX REV CODE- 250/637: Performed by: PHYSICIAN ASSISTANT

## 2018-10-12 PROCEDURE — 77030015766: Performed by: INTERNAL MEDICINE

## 2018-10-12 PROCEDURE — 85025 COMPLETE CBC W/AUTO DIFF WBC: CPT | Performed by: INTERNAL MEDICINE

## 2018-10-12 PROCEDURE — 65660000000 HC RM CCU STEPDOWN

## 2018-10-12 PROCEDURE — 74011250636 HC RX REV CODE- 250/636: Performed by: HOSPITALIST

## 2018-10-12 PROCEDURE — 74011250637 HC RX REV CODE- 250/637: Performed by: HOSPITALIST

## 2018-10-12 PROCEDURE — 74011000250 HC RX REV CODE- 250: Performed by: INTERNAL MEDICINE

## 2018-10-12 PROCEDURE — 93458 L HRT ARTERY/VENTRICLE ANGIO: CPT | Performed by: INTERNAL MEDICINE

## 2018-10-12 PROCEDURE — 80061 LIPID PANEL: CPT | Performed by: INTERNAL MEDICINE

## 2018-10-12 PROCEDURE — 77030013797 HC KT TRNSDUC PRSSR EDWD -A: Performed by: INTERNAL MEDICINE

## 2018-10-12 PROCEDURE — C1725 CATH, TRANSLUMIN NON-LASER: HCPCS | Performed by: INTERNAL MEDICINE

## 2018-10-12 PROCEDURE — 74011636320 HC RX REV CODE- 636/320: Performed by: INTERNAL MEDICINE

## 2018-10-12 PROCEDURE — C1887 CATHETER, GUIDING: HCPCS | Performed by: INTERNAL MEDICINE

## 2018-10-12 PROCEDURE — 74011250636 HC RX REV CODE- 250/636

## 2018-10-12 PROCEDURE — 74011250636 HC RX REV CODE- 250/636: Performed by: INTERNAL MEDICINE

## 2018-10-12 PROCEDURE — 36415 COLL VENOUS BLD VENIPUNCTURE: CPT | Performed by: INTERNAL MEDICINE

## 2018-10-12 PROCEDURE — 83036 HEMOGLOBIN GLYCOSYLATED A1C: CPT | Performed by: PHYSICIAN ASSISTANT

## 2018-10-12 PROCEDURE — 99153 MOD SED SAME PHYS/QHP EA: CPT | Performed by: INTERNAL MEDICINE

## 2018-10-12 PROCEDURE — 85730 THROMBOPLASTIN TIME PARTIAL: CPT | Performed by: PHYSICIAN ASSISTANT

## 2018-10-12 PROCEDURE — 93880 EXTRACRANIAL BILAT STUDY: CPT

## 2018-10-12 PROCEDURE — B2111ZZ FLUOROSCOPY OF MULTIPLE CORONARY ARTERIES USING LOW OSMOLAR CONTRAST: ICD-10-PCS | Performed by: INTERNAL MEDICINE

## 2018-10-12 PROCEDURE — 93005 ELECTROCARDIOGRAM TRACING: CPT

## 2018-10-12 PROCEDURE — 80048 BASIC METABOLIC PNL TOTAL CA: CPT | Performed by: INTERNAL MEDICINE

## 2018-10-12 PROCEDURE — 4A023N7 MEASUREMENT OF CARDIAC SAMPLING AND PRESSURE, LEFT HEART, PERCUTANEOUS APPROACH: ICD-10-PCS | Performed by: INTERNAL MEDICINE

## 2018-10-12 PROCEDURE — C1769 GUIDE WIRE: HCPCS | Performed by: INTERNAL MEDICINE

## 2018-10-12 PROCEDURE — 74011250637 HC RX REV CODE- 250/637: Performed by: NURSE PRACTITIONER

## 2018-10-12 PROCEDURE — 92928 PRQ TCAT PLMT NTRAC ST 1 LES: CPT | Performed by: INTERNAL MEDICINE

## 2018-10-12 PROCEDURE — 99152 MOD SED SAME PHYS/QHP 5/>YRS: CPT | Performed by: INTERNAL MEDICINE

## 2018-10-12 PROCEDURE — 74011250637 HC RX REV CODE- 250/637

## 2018-10-12 PROCEDURE — 74011000258 HC RX REV CODE- 258: Performed by: INTERNAL MEDICINE

## 2018-10-12 PROCEDURE — C1874 STENT, COATED/COV W/DEL SYS: HCPCS | Performed by: INTERNAL MEDICINE

## 2018-10-12 PROCEDURE — 027034Z DILATION OF CORONARY ARTERY, ONE ARTERY WITH DRUG-ELUTING INTRALUMINAL DEVICE, PERCUTANEOUS APPROACH: ICD-10-PCS | Performed by: INTERNAL MEDICINE

## 2018-10-12 DEVICE — XIENCE ALPINE EVEROLIMUS ELUTING CORONARY STENT SYSTEM 2.75 MM X 15 MM / RAPID-EXCHANGE
Type: IMPLANTABLE DEVICE | Status: FUNCTIONAL
Brand: XIENCE ALPINE

## 2018-10-12 RX ORDER — MIDAZOLAM HYDROCHLORIDE 1 MG/ML
INJECTION, SOLUTION INTRAMUSCULAR; INTRAVENOUS
Status: DISPENSED
Start: 2018-10-12 | End: 2018-10-12

## 2018-10-12 RX ORDER — BISACODYL 5 MG
5 TABLET, DELAYED RELEASE (ENTERIC COATED) ORAL
Status: COMPLETED | OUTPATIENT
Start: 2018-10-12 | End: 2018-10-12

## 2018-10-12 RX ORDER — SODIUM CHLORIDE 0.9 % (FLUSH) 0.9 %
5-10 SYRINGE (ML) INJECTION EVERY 8 HOURS
Status: DISCONTINUED | OUTPATIENT
Start: 2018-10-12 | End: 2018-10-13 | Stop reason: HOSPADM

## 2018-10-12 RX ORDER — ISOSORBIDE MONONITRATE 30 MG/1
30 TABLET, EXTENDED RELEASE ORAL DAILY
Status: DISCONTINUED | OUTPATIENT
Start: 2018-10-13 | End: 2018-10-13 | Stop reason: HOSPADM

## 2018-10-12 RX ORDER — ONDANSETRON 2 MG/ML
4 INJECTION INTRAMUSCULAR; INTRAVENOUS
Status: DISCONTINUED | OUTPATIENT
Start: 2018-10-12 | End: 2018-10-13 | Stop reason: HOSPADM

## 2018-10-12 RX ORDER — GUAIFENESIN 100 MG/5ML
LIQUID (ML) ORAL AS NEEDED
Status: DISCONTINUED | OUTPATIENT
Start: 2018-10-12 | End: 2018-10-12 | Stop reason: HOSPADM

## 2018-10-12 RX ORDER — VERAPAMIL HYDROCHLORIDE 2.5 MG/ML
INJECTION, SOLUTION INTRAVENOUS AS NEEDED
Status: DISCONTINUED | OUTPATIENT
Start: 2018-10-12 | End: 2018-10-12 | Stop reason: HOSPADM

## 2018-10-12 RX ORDER — GUAIFENESIN 100 MG/5ML
LIQUID (ML) ORAL
Status: DISPENSED
Start: 2018-10-12 | End: 2018-10-12

## 2018-10-12 RX ORDER — MIDAZOLAM HYDROCHLORIDE 1 MG/ML
INJECTION, SOLUTION INTRAMUSCULAR; INTRAVENOUS AS NEEDED
Status: DISCONTINUED | OUTPATIENT
Start: 2018-10-12 | End: 2018-10-12 | Stop reason: HOSPADM

## 2018-10-12 RX ORDER — ATROPINE SULFATE 0.1 MG/ML
INJECTION INTRAVENOUS
Status: DISPENSED
Start: 2018-10-12 | End: 2018-10-12

## 2018-10-12 RX ORDER — LIDOCAINE HYDROCHLORIDE 10 MG/ML
INJECTION, SOLUTION EPIDURAL; INFILTRATION; INTRACAUDAL; PERINEURAL AS NEEDED
Status: DISCONTINUED | OUTPATIENT
Start: 2018-10-12 | End: 2018-10-12 | Stop reason: HOSPADM

## 2018-10-12 RX ORDER — BIVALIRUDIN 250 MG/5ML
INJECTION, POWDER, LYOPHILIZED, FOR SOLUTION INTRAVENOUS AS NEEDED
Status: DISCONTINUED | OUTPATIENT
Start: 2018-10-12 | End: 2018-10-12 | Stop reason: HOSPADM

## 2018-10-12 RX ORDER — BIVALIRUDIN 250 MG/5ML
INJECTION, POWDER, LYOPHILIZED, FOR SOLUTION INTRAVENOUS
Status: DISPENSED
Start: 2018-10-12 | End: 2018-10-12

## 2018-10-12 RX ORDER — FENTANYL CITRATE 50 UG/ML
INJECTION, SOLUTION INTRAMUSCULAR; INTRAVENOUS AS NEEDED
Status: DISCONTINUED | OUTPATIENT
Start: 2018-10-12 | End: 2018-10-12 | Stop reason: HOSPADM

## 2018-10-12 RX ORDER — NITROGLYCERIN 40 MG/100ML
INJECTION INTRAVENOUS
Status: DISPENSED
Start: 2018-10-12 | End: 2018-10-12

## 2018-10-12 RX ORDER — HEPARIN SODIUM 1000 [USP'U]/ML
INJECTION, SOLUTION INTRAVENOUS; SUBCUTANEOUS
Status: DISPENSED
Start: 2018-10-12 | End: 2018-10-12

## 2018-10-12 RX ORDER — METOPROLOL SUCCINATE 50 MG/1
50 TABLET, EXTENDED RELEASE ORAL DAILY
Status: DISCONTINUED | OUTPATIENT
Start: 2018-10-12 | End: 2018-10-13 | Stop reason: HOSPADM

## 2018-10-12 RX ORDER — LIDOCAINE HYDROCHLORIDE 10 MG/ML
INJECTION, SOLUTION EPIDURAL; INFILTRATION; INTRACAUDAL; PERINEURAL
Status: DISPENSED
Start: 2018-10-12 | End: 2018-10-12

## 2018-10-12 RX ORDER — VERAPAMIL HYDROCHLORIDE 2.5 MG/ML
INJECTION, SOLUTION INTRAVENOUS
Status: DISPENSED
Start: 2018-10-12 | End: 2018-10-12

## 2018-10-12 RX ORDER — SODIUM CHLORIDE 0.9 % (FLUSH) 0.9 %
5-10 SYRINGE (ML) INJECTION AS NEEDED
Status: DISCONTINUED | OUTPATIENT
Start: 2018-10-12 | End: 2018-10-13 | Stop reason: HOSPADM

## 2018-10-12 RX ORDER — SODIUM CHLORIDE 450 MG/100ML
150 INJECTION, SOLUTION INTRAVENOUS CONTINUOUS
Status: DISPENSED | OUTPATIENT
Start: 2018-10-12 | End: 2018-10-12

## 2018-10-12 RX ORDER — POLYETHYLENE GLYCOL 3350 17 G/17G
17 POWDER, FOR SOLUTION ORAL
Status: DISCONTINUED | OUTPATIENT
Start: 2018-10-12 | End: 2018-10-13

## 2018-10-12 RX ORDER — FENTANYL CITRATE 50 UG/ML
INJECTION, SOLUTION INTRAMUSCULAR; INTRAVENOUS
Status: DISPENSED
Start: 2018-10-12 | End: 2018-10-12

## 2018-10-12 RX ORDER — NITROGLYCERIN 400 UG/1
1 SPRAY ORAL
Status: DISPENSED | OUTPATIENT
Start: 2018-10-12 | End: 2018-10-13

## 2018-10-12 RX ADMIN — BISACODYL 5 MG: 5 TABLET, COATED ORAL at 21:04

## 2018-10-12 RX ADMIN — ONDANSETRON 4 MG: 2 INJECTION, SOLUTION INTRAMUSCULAR; INTRAVENOUS at 17:56

## 2018-10-12 RX ADMIN — TICAGRELOR 180 MG: 90 TABLET ORAL at 10:46

## 2018-10-12 RX ADMIN — VENLAFAXINE 37.5 MG: 37.5 TABLET ORAL at 21:04

## 2018-10-12 RX ADMIN — TICAGRELOR 90 MG: 90 TABLET ORAL at 21:04

## 2018-10-12 RX ADMIN — METOPROLOL SUCCINATE 50 MG: 50 TABLET, EXTENDED RELEASE ORAL at 16:01

## 2018-10-12 RX ADMIN — SODIUM CHLORIDE 20 MG: 9 INJECTION INTRAMUSCULAR; INTRAVENOUS; SUBCUTANEOUS at 21:04

## 2018-10-12 RX ADMIN — Medication 10 ML: at 21:13

## 2018-10-12 RX ADMIN — VENLAFAXINE 37.5 MG: 37.5 TABLET ORAL at 16:01

## 2018-10-12 RX ADMIN — ATORVASTATIN CALCIUM 80 MG: 40 TABLET, FILM COATED ORAL at 21:04

## 2018-10-12 RX ADMIN — LORAZEPAM 1 MG: 1 TABLET ORAL at 21:04

## 2018-10-12 NOTE — PROGRESS NOTES
TRANSFER - IN REPORT: 
 
Verbal report received from  [address] [place] [address] (name) on Bing Bong  being received from [facility], (unit) for routine post - op Report consisted of patients Situation, Background, Assessment and  
Recommendations(SBAR). Information from the following report(s) SBAR, Kardex and Procedure Summary was reviewed with the receiving nurse. Opportunity for questions and clarification was provided. Assessment completed upon patients arrival to unit and care assumed. 1055 pt sleepy, Right radial dry and intact, vitals stable will contiue to monitor pt

## 2018-10-12 NOTE — PROGRESS NOTES
NUTRITION Nutrition Consult: Diet Education RECOMMENDATIONS / PLAN:  
 
No additional nutritional interventions indicated at this time. Please consult nutrition if further nutrtion intervention is needed. Will re-screen this patient per policy. NUTRITION INTERVENTIONS:  
 
[x] Meals/Snacks: modified composition 
[x] Nutrition counseling/education: cardiac diabetic diet provided 10/12/18 ASSESSMENT:  
 
Diet: DIET CARDIAC Regular; Consistent Carb 1800kcal; No Conc. Sweets Po intake:  [x]  Good    []  Fair    []  Poor Weight History:  [x] No unintentional weight loss PTA    [] Had weight change:   
Nutrition Risk: None identified at this time Provided Education On: Cardiac diabetic diet Person(s) Instructed:  [x]  Patient    [] Family    [] Other: 
Education Methods Used:  [x] Explanation    [x] Handout    [] Other:  
Patients Readiness:  [] Abiel Haines    [x] Acceptance    [] Non-Acceptance    [] Refused Learning Limitations:   [x] None identified    [] Identified: 
Level of Comprehension:  [x] Good    [] Needs Reinforcement Additional Concerns/Comments: Cardiac, diabetic diet education provided to pt and family members today. Pt receptive. Follow-up education indicated:   [x] No    [] Yes Discharge needs: [x] None identified    [] Identified and addressed Leila Long RD Pager: [phone]

## 2018-10-12 NOTE — PROGRESS NOTES
DSTAT removed from right radial site. No bleeding or hematoma noted. Pt instructed on proper post cath care. Pt verbalized understanding. Wrist brace reapplied. Will continue to monitor.

## 2018-10-12 NOTE — PROGRESS NOTES
[facility] Hospitalist Group Progress Note Patient: Rebel Preston Age: 59 y.o. : 1955 MR#: [id number] SSN: [id number] Date: 10/12/2018 Subjective:  
 
Had an episode of vomiting last night. At time of visit c/o epigastric type pain, nausea - states occurring since ? Metoprolol given a short while ago, constipation LBM 10/10/18 Assessment/Plan: 1. NSTEMI s/p stent placement - cardiac cath with stent placement done today to right wrist 
2. Epigastric pain - cont pepcid IV; maalox PRN added 3. Constipation - give dulcolax now; add miralax PRN 4. Hypertensive urgency - significant elevation on admission, now improved; cont hctz, imdur, BB, cozaar 5. Hyperlipidemia - ; on high dose lipitor 6. Nausea - Will treat constipation and reflux symptoms. Family inquiring about ? GI consult given recurrence of symptoms. Planned revisit with patient tomorrow at [facility] for further discussions and interventions as appropriate. Additional Notes:   
 
Case discussed with:  [x]Patient  [x]Family  [x]Nursing  []Case Management DVT Prophylaxis:  []Lovenox  []Hep SQ  []SCDs  []Coumadin   []On Heparin gtt Objective:  
VS:  
Visit Vitals  /66 (BP 1 Location: Left arm, BP Patient Position: At rest)  Pulse 67  Temp 98.5 °F (36.9 °C)  Resp 20  
 Ht 5' 1\" (1.549 m)  Wt 72.3 kg (159 lb 8 oz)  SpO2 98%  Breastfeeding No  
 BMI 30.14 kg/m2 Tmax/24hrs: Temp (24hrs), Av.5 °F (36.9 °C), Min:98.4 °F (36.9 °C), Max:98.5 °F (36.9 °C) No intake or output data in the 24 hours ending 10/12/18 0900 General:  Alert, NAD Cardiovascular:  RRR; right wrist dressing intact Pulmonary:  LSC throughout GI:  +BS in all four quadrants, soft, mild tenderness Extremities:  No edema Neuro: A&O x 4 Family contact:  Javier Mosquera [phone] Labs:   
Recent Results (from the past 24 hour(s)) EKG, 12 LEAD, INITIAL Collection Time: 10/11/18  9:15 AM  
Result Value Ref Range Ventricular Rate 62 BPM  
 Atrial Rate 62 BPM  
 P-R Interval 216 ms  
 QRS Duration 102 ms Q-T Interval 444 ms QTC Calculation (Bezet) 450 ms Calculated P Axis 40 degrees Calculated R Axis -58 degrees Calculated T Axis 34 degrees Diagnosis Sinus rhythm with 1st degree AV block Incomplete right bundle branch block Left anterior fascicular block Voltage criteria for left ventricular hypertrophy Abnormal ECG No previous ECGs available Confirmed by Warden Cody MD, Kirsty Mcclure ([id number]) on 10/11/2018 1:15:13 PM 
  
METABOLIC PANEL, BASIC Collection Time: 10/11/18  9:45 AM  
Result Value Ref Range Sodium 138 136 - 145 mmol/L Potassium 3.7 3.5 - 5.5 mmol/L Chloride 102 100 - 108 mmol/L  
 CO2 29 21 - 32 mmol/L Anion gap 7 3.0 - 18 mmol/L Glucose 125 (H) 74 - 99 mg/dL BUN 10 7.0 - 18 MG/DL Creatinine 0.78 0.6 - 1.3 MG/DL  
 BUN/Creatinine ratio 13 12 - 20 GFR est AA >60 >60 ml/min/1.73m2 GFR est non-AA >60 >60 ml/min/1.73m2 Calcium 9.1 8.5 - 10.1 MG/DL  
CBC WITH AUTOMATED DIFF Collection Time: 10/11/18  9:45 AM  
Result Value Ref Range WBC 6.1 4.6 - 13.2 K/uL  
 RBC 5.03 4.20 - 5.30 M/uL  
 HGB 13.7 12.0 - 16.0 g/dL HCT 41.0 35.0 - 45.0 % MCV 81.5 74.0 - 97.0 FL  
 MCH 27.2 24.0 - 34.0 PG  
 MCHC 33.4 31.0 - 37.0 g/dL  
 RDW 13.4 11.6 - 14.5 % PLATELET 894 920 - 829 K/uL MPV 10.4 9.2 - 11.8 FL  
 NEUTROPHILS 69 40 - 73 % LYMPHOCYTES 23 21 - 52 % MONOCYTES 6 3 - 10 % EOSINOPHILS 1 0 - 5 % BASOPHILS 1 0 - 2 %  
 ABS. NEUTROPHILS 4.2 1.8 - 8.0 K/UL  
 ABS. LYMPHOCYTES 1.4 0.9 - 3.6 K/UL  
 ABS. MONOCYTES 0.4 0.05 - 1.2 K/UL  
 ABS. EOSINOPHILS 0.1 0.0 - 0.4 K/UL  
 ABS. BASOPHILS 0.0 0.0 - 0.1 K/UL  
 DF AUTOMATED    
TROPONIN I Collection Time: 10/11/18  9:45 AM  
Result Value Ref Range  Troponin-I, Qt. 0.47 (H) 0.00 - 0.06 NG/ML  
MAGNESIUM  
 Collection Time: 10/11/18  9:45 AM  
Result Value Ref Range Magnesium 1.6 1.6 - 2.6 mg/dL CARDIAC PANEL,(CK, CKMB & TROPONIN) Collection Time: 10/11/18  4:21 PM  
Result Value Ref Range  (H) 26 - 192 U/L  
 CK - MB 77.4 (H) <3.6 ng/ml CK-MB Index 12.1 (H) 0.0 - 4.0 % Troponin-I, Qt. 14.50 (HH) 0.0 - 0.045 NG/ML  
HEPATIC FUNCTION PANEL Collection Time: 10/11/18  4:21 PM  
Result Value Ref Range Protein, total 7.5 6.4 - 8.2 g/dL Albumin 4.0 3.4 - 5.0 g/dL Globulin 3.5 2.0 - 4.0 g/dL A-G Ratio 1.1 0.8 - 1.7 Bilirubin, total 0.5 0.2 - 1.0 MG/DL Bilirubin, direct 0.1 0.0 - 0.2 MG/DL Alk. phosphatase 70 45 - 117 U/L  
 AST (SGOT) 71 (H) 15 - 37 U/L  
 ALT (SGPT) 28 13 - 56 U/L  
LIPASE Collection Time: 10/11/18  4:21 PM  
Result Value Ref Range Lipase 92 73 - 393 U/L  
PTT Collection Time: 10/11/18  6:59 PM  
Result Value Ref Range aPTT 38.0 (H) 23.0 - 36.4 SEC  
TROPONIN I Collection Time: 10/11/18 10:30 PM  
Result Value Ref Range Troponin-I, Qt. 10.80 (HH) 0.0 - 0.045 NG/ML  
HEMOGLOBIN A1C WITH EAG Collection Time: 10/12/18  2:42 AM  
Result Value Ref Range Hemoglobin A1c 6.0 (H) 4.2 - 5.6 % Est. average glucose 126 mg/dL METABOLIC PANEL, BASIC Collection Time: 10/12/18  2:42 AM  
Result Value Ref Range Sodium 138 136 - 145 mmol/L Potassium 3.6 3.5 - 5.5 mmol/L Chloride 104 100 - 108 mmol/L  
 CO2 26 21 - 32 mmol/L Anion gap 8 3.0 - 18 mmol/L Glucose 133 (H) 74 - 99 mg/dL BUN 8 7.0 - 18 MG/DL Creatinine 0.85 0.6 - 1.3 MG/DL  
 BUN/Creatinine ratio 9 (L) 12 - 20 GFR est AA >60 >60 ml/min/1.73m2 GFR est non-AA >60 >60 ml/min/1.73m2 Calcium 9.1 8.5 - 10.1 MG/DL  
CBC WITH AUTOMATED DIFF Collection Time: 10/12/18  2:42 AM  
Result Value Ref Range WBC 8.1 4.6 - 13.2 K/uL  
 RBC 4.97 4.20 - 5.30 M/uL  
 HGB 13.3 12.0 - 16.0 g/dL HCT 38.9 35.0 - 45.0 %  MCV 78.3 74.0 - 97.0 FL  
 MCH 26.8 24.0 - 34.0 PG  
 MCHC 34.2 31.0 - 37.0 g/dL  
 RDW 13.2 11.6 - 14.5 % PLATELET 363 142 - 971 K/uL MPV 10.0 9.2 - 11.8 FL  
 NEUTROPHILS 65 40 - 73 % LYMPHOCYTES 27 21 - 52 % MONOCYTES 7 3 - 10 % EOSINOPHILS 1 0 - 5 % BASOPHILS 0 0 - 2 %  
 ABS. NEUTROPHILS 5.3 1.8 - 8.0 K/UL  
 ABS. LYMPHOCYTES 2.2 0.9 - 3.6 K/UL  
 ABS. MONOCYTES 0.6 0.05 - 1.2 K/UL  
 ABS. EOSINOPHILS 0.0 0.0 - 0.4 K/UL  
 ABS. BASOPHILS 0.0 0.0 - 0.1 K/UL  
 DF AUTOMATED    
LIPID PANEL Collection Time: 10/12/18  2:42 AM  
Result Value Ref Range LIPID PROFILE Cholesterol, total 339 (H) <200 MG/DL Triglyceride 123 <150 MG/DL  
 HDL Cholesterol 78 (H) 40 - 60 MG/DL  
 LDL, calculated 236.4 (H) 0 - 100 MG/DL VLDL, calculated 24.6 MG/DL  
 CHOL/HDL Ratio 4.3 0 - 5.0    
PTT Collection Time: 10/12/18  2:42 AM  
Result Value Ref Range aPTT 83.3 (H) 23.0 - 36.4 SEC  
EKG, 12 LEAD, SUBSEQUENT Collection Time: 10/12/18  7:06 AM  
Result Value Ref Range Ventricular Rate 63 BPM  
 Atrial Rate 63 BPM  
 P-R Interval 200 ms QRS Duration 100 ms Q-T Interval 500 ms QTC Calculation (Bezet) 511 ms Calculated P Axis 47 degrees Calculated R Axis -62 degrees Calculated T Axis -67 degrees Diagnosis Normal sinus rhythm Left axis deviation Incomplete right bundle branch block Moderate voltage criteria for LVH, may be normal variant Inferior infarct , age undetermined T wave abnormality, consider lateral ischemia Prolonged QT Abnormal ECG When compared with ECG of 11-OCT-2018 09:15, Inferior infarct is now present T wave inversion now evident in Inferior leads T wave inversion now evident in Lateral leads QT has lengthened DUPLEX CAROTID BILATERAL Collection Time: 10/12/18  8:32 AM  
Result Value Ref Range Right cca dist sys 72.34 cm/s Right CCA dist kaplan 20.58 cm/s RIGHT COMMON CAROTID ARTERY MID S 68.46 cm/s RIGHT COMMON CAROTID ARTERY MID D 17.99 cm/s Right CCA prox sys 76.22 cm/s Right CCA prox kaplan 19.28 cm/s Right eca sys 145.51 cm/s RIGHT EXTERNAL CAROTID ARTERY D 22.76 cm/s Right ICA dist sys 70.28 cm/s Right ICA dist kaplan 34.20 cm/s Right ICA mid sys 114.96 cm/s Right ICA mid kaplan 53.10 cm/s Right ICA prox sys 60.09 cm/s Right ICA prox kaplan 25.65 cm/s Right vertebral sys 51.96 cm/s RIGHT VERTEBRAL ARTERY D 15.35 cm/s Left Outflow Vessel EDV 0.00 cm/s Left CCA dist sys 51.64 cm/s Left CCA dist kaplan 15.40 cm/s LEFT COMMON CAROTID ARTERY MID S 59.40 cm/s LEFT COMMON CAROTID ARTERY MID D 15.40 cm/s Left CCA prox sys 111.26 cm/s Left CCA prox kaplan 22.99 cm/s Left ECA sys 131.86 cm/s LEFT EXTERNAL CAROTID ARTERY D 7.69 cm/s Left ICA dist sys 42.66 cm/s Left ICA dist kaplan 19.10 cm/s Left ICA mid sys 68.46 cm/s Left ICA mid kaplan 36.10 cm/s Left ICA prox sys 59.23 cm/s Left ICA prox kaplan 22.59 cm/s Left vertebral sys 43.53 cm/s LEFT VERTEBRAL ARTERY D 14.74 cm/s Right subclavian sys 98.0 cm/s Left subclavian sys 94.0 cm/s Signed By: Remington Kaye NP October 12, 2018

## 2018-10-12 NOTE — PROGRESS NOTES
Reason for Admission:   Elevated troponin, Atypical chest pain RRAT Score:         5 Plan for utilizing home health:  yes Likelihood of Readmission:  Green/low Transition of Care Plan:       Pt is AOx4 and reports she was independent prior to admission. She has no home DME, She drove her self to appointments. She states her pcp is Escobar Johnson [phone] and appointment has been made for her to see pcp. She has h2h ordered and she was agreeable to it. Referral has been sent to [address] [place] [address] FOR REHAB MEDIC[facility] was called. She states she has a ride home when discharged.

## 2018-10-12 NOTE — PROGRESS NOTES
TRANSFER - OUT REPORT: 
 
Verbal report given to CARLOZ Medel(name) on Kirti Still  being transferred to (unit) for routine progression of care Report consisted of patients Situation, Background, Assessment and  
Recommendations(SBAR). Information from the following report(s) SBAR, Kardex, Procedure Summary and MAR was reviewed with the receiving nurse. Lines:  
Peripheral IV 10/11/18 Right Forearm (Active) Site Assessment Clean, dry, & intact 10/11/2018  7:41 PM  
Phlebitis Assessment 0 10/11/2018  7:41 PM  
Infiltration Assessment 0 10/11/2018  7:41 PM  
Dressing Status Clean, dry, & intact 10/11/2018  7:41 PM  
Dressing Type Transparent 10/11/2018  7:41 PM  
Hub Color/Line Status Pink 10/11/2018  7:41 PM  
Action Taken Open ports on tubing capped 10/11/2018  3:59 PM  
Alcohol Cap Used Yes 10/11/2018  7:41 PM  
  
 
Opportunity for questions and clarification was provided. Patient transported with: 
 Registered Nurse Dressing site to right arm verified with Jacqueline CARTY dry and intact no bleeding or hematoma noted.

## 2018-10-12 NOTE — ROUTINE PROCESS
Bedside and Verbal shift change report given to (oncoming nurse) by Violeta Mcgrath (offgoing nurse). Report included the following information SBAR, Intake/Output, MAR, Recent Results and Cardiac Rhythm Sinus Rhythm.

## 2018-10-12 NOTE — PROGRESS NOTES
Patient been complaining of stomach pain, vomited and was given Zofran 4 mg IV. Hospitalist informed about same who stated no other interventions at this time, however; watch patient for now and reassess for effectiveness of medication.

## 2018-10-12 NOTE — PROGRESS NOTES
conducted an initial consultation and Spiritual Assessment for Lial Dave, who is a 59 y.o.,female. Patients Primary Language is: [place]. According to the patients EMR Sikhism Affiliation is: Corey Vieira.  
 
The reason the Patient came to the hospital is:  
Patient Active Problem List  
 Diagnosis Date Noted  Atypical chest pain 10/11/2018  Elevated troponin 10/11/2018 The  provided the following Interventions: 
Initiated a relationship of care and support. Explored issues of jacinda, belief, spirituality and Jewish/ritual needs while hospitalized. Listened empathically. Provided chaplaincy education. Provided information about Spiritual Care Services. Offered prayer and assurance of continued prayers on patient's behalf. Chart reviewed. The following outcomes where achieved: 
Patient shared limited information about both their medical narrative and spiritual journey/beliefs.  confirmed Patient's Sikhism Affiliation. Patient processed feeling about current hospitalization. Patient expressed gratitude for 's visit. Assessment: 
Patient does not have any Jewish/cultural needs that will affect patients preferences in health care. There are no spiritual or Jewish issues which require intervention at this time. Plan: 
Chaplains will continue to follow and will provide pastoral care on an as needed/requested basis.  recommends bedside caregivers page  on duty if patient shows signs of acute spiritual or emotional distress. Chaplain Resident Tala Jerez Spiritual Care  
[phone]

## 2018-10-13 ENCOUNTER — APPOINTMENT (OUTPATIENT)
Dept: GENERAL RADIOLOGY | Age: 63
DRG: 247 | End: 2018-10-13
Attending: NURSE PRACTITIONER
Payer: COMMERCIAL

## 2018-10-13 ENCOUNTER — HOME HEALTH ADMISSION (OUTPATIENT)
Dept: HOME HEALTH SERVICES | Facility: HOME HEALTH | Age: 63
End: 2018-10-13

## 2018-10-13 ENCOUNTER — APPOINTMENT (OUTPATIENT)
Dept: NON INVASIVE DIAGNOSTICS | Age: 63
DRG: 247 | End: 2018-10-13
Attending: PHYSICIAN ASSISTANT
Payer: COMMERCIAL

## 2018-10-13 VITALS
RESPIRATION RATE: 18 BRPM | HEART RATE: 64 BPM | SYSTOLIC BLOOD PRESSURE: 114 MMHG | DIASTOLIC BLOOD PRESSURE: 70 MMHG | TEMPERATURE: 99.8 F | WEIGHT: 158 LBS | BODY MASS INDEX: 29.83 KG/M2 | HEIGHT: 61 IN | OXYGEN SATURATION: 98 %

## 2018-10-13 LAB
ALBUMIN SERPL-MCNC: 3.6 G/DL (ref 3.4–5)
ALBUMIN/GLOB SERPL: 0.9 {RATIO} (ref 0.8–1.7)
ALP SERPL-CCNC: 64 U/L (ref 45–117)
ALT SERPL-CCNC: 25 U/L (ref 13–56)
ANION GAP SERPL CALC-SCNC: 7 MMOL/L (ref 3–18)
AST SERPL-CCNC: 32 U/L (ref 15–37)
ATRIAL RATE: 67 BPM
BILIRUB DIRECT SERPL-MCNC: 0.1 MG/DL (ref 0–0.2)
BILIRUB SERPL-MCNC: 0.6 MG/DL (ref 0.2–1)
BUN SERPL-MCNC: 10 MG/DL (ref 7–18)
BUN/CREAT SERPL: 10 (ref 12–20)
CALCIUM SERPL-MCNC: 9 MG/DL (ref 8.5–10.1)
CALCULATED P AXIS, ECG09: 27 DEGREES
CALCULATED R AXIS, ECG10: -62 DEGREES
CALCULATED T AXIS, ECG11: -67 DEGREES
CHLORIDE SERPL-SCNC: 102 MMOL/L (ref 100–108)
CO2 SERPL-SCNC: 28 MMOL/L (ref 21–32)
CREAT SERPL-MCNC: 1 MG/DL (ref 0.6–1.3)
DIAGNOSIS, 93000: NORMAL
ECHO AO ROOT DIAM: 2.56 CM
ECHO LA AREA 4C: 18 CM2
ECHO LA VOL 2C: 51.6 ML (ref 22–52)
ECHO LA VOL 4C: 40.37 ML (ref 22–52)
ECHO LA VOL BP: 49.92 ML (ref 22–52)
ECHO LA VOL/BSA BIPLANE: 29.21 ML/M2
ECHO LA VOLUME INDEX A2C: 30.19 ML/M2
ECHO LA VOLUME INDEX A4C: 23.62 ML/M2
ECHO LV INTERNAL DIMENSION DIASTOLIC: 4.31 CM (ref 3.9–5.3)
ECHO LV INTERNAL DIMENSION SYSTOLIC: 2.46 CM
ECHO LV IVSD: 1.28 CM (ref 0.6–0.9)
ECHO LV MASS 2D: 228.9 G (ref 67–162)
ECHO LV MASS INDEX 2D: 133.9 G/M2
ECHO LV POSTERIOR WALL DIASTOLIC: 1.19 CM (ref 0.6–0.9)
ECHO LVOT DIAM: 1.89 CM
ECHO LVOT PEAK GRADIENT: 5.7 MMHG
ECHO LVOT PEAK VELOCITY: 119.46 CM/S
ECHO LVOT VTI: 22.99 CM
ECHO MV A VELOCITY: 96.32 CM/S
ECHO MV E DECELERATION TIME (DT): 250.9 MS
ECHO MV E VELOCITY: 0.63 CM/S
ECHO MV E/A RATIO: 0.01
GLOBULIN SER CALC-MCNC: 3.8 G/DL (ref 2–4)
GLUCOSE SERPL-MCNC: 123 MG/DL (ref 74–99)
LIPASE SERPL-CCNC: 155 U/L (ref 73–393)
MAGNESIUM SERPL-MCNC: 1.9 MG/DL (ref 1.6–2.6)
P-R INTERVAL, ECG05: 194 MS
PHOSPHATE SERPL-MCNC: 3.3 MG/DL (ref 2.5–4.9)
POTASSIUM SERPL-SCNC: 3.4 MMOL/L (ref 3.5–5.5)
PROT SERPL-MCNC: 7.4 G/DL (ref 6.4–8.2)
Q-T INTERVAL, ECG07: 458 MS
QRS DURATION, ECG06: 100 MS
QTC CALCULATION (BEZET), ECG08: 483 MS
SODIUM SERPL-SCNC: 137 MMOL/L (ref 136–145)
VENTRICULAR RATE, ECG03: 67 BPM

## 2018-10-13 PROCEDURE — 36415 COLL VENOUS BLD VENIPUNCTURE: CPT | Performed by: HOSPITALIST

## 2018-10-13 PROCEDURE — 93306 TTE W/DOPPLER COMPLETE: CPT

## 2018-10-13 PROCEDURE — 83735 ASSAY OF MAGNESIUM: CPT | Performed by: HOSPITALIST

## 2018-10-13 PROCEDURE — 74011250637 HC RX REV CODE- 250/637: Performed by: INTERNAL MEDICINE

## 2018-10-13 PROCEDURE — 83690 ASSAY OF LIPASE: CPT | Performed by: HOSPITALIST

## 2018-10-13 PROCEDURE — 74011250637 HC RX REV CODE- 250/637: Performed by: EMERGENCY MEDICINE

## 2018-10-13 PROCEDURE — 74011250636 HC RX REV CODE- 250/636: Performed by: NURSE PRACTITIONER

## 2018-10-13 PROCEDURE — 74011250637 HC RX REV CODE- 250/637: Performed by: PHYSICIAN ASSISTANT

## 2018-10-13 PROCEDURE — 74011250637 HC RX REV CODE- 250/637: Performed by: HOSPITALIST

## 2018-10-13 PROCEDURE — 80076 HEPATIC FUNCTION PANEL: CPT | Performed by: HOSPITALIST

## 2018-10-13 PROCEDURE — C9113 INJ PANTOPRAZOLE SODIUM, VIA: HCPCS | Performed by: NURSE PRACTITIONER

## 2018-10-13 PROCEDURE — 93005 ELECTROCARDIOGRAM TRACING: CPT

## 2018-10-13 PROCEDURE — 74018 RADEX ABDOMEN 1 VIEW: CPT

## 2018-10-13 PROCEDURE — 84100 ASSAY OF PHOSPHORUS: CPT | Performed by: HOSPITALIST

## 2018-10-13 PROCEDURE — 80048 BASIC METABOLIC PNL TOTAL CA: CPT | Performed by: HOSPITALIST

## 2018-10-13 PROCEDURE — 74011250637 HC RX REV CODE- 250/637: Performed by: NURSE PRACTITIONER

## 2018-10-13 RX ORDER — ISOSORBIDE MONONITRATE 30 MG/1
30 TABLET, EXTENDED RELEASE ORAL DAILY
Qty: 30 TAB | Refills: 0 | Status: SHIPPED | OUTPATIENT
Start: 2018-10-14 | End: 2018-11-01 | Stop reason: SDUPTHER

## 2018-10-13 RX ORDER — ISOSORBIDE MONONITRATE 30 MG/1
30 TABLET, EXTENDED RELEASE ORAL DAILY
Qty: 30 TAB | Refills: 0 | Status: SHIPPED | OUTPATIENT
Start: 2018-10-14 | End: 2018-10-13

## 2018-10-13 RX ORDER — POLYETHYLENE GLYCOL 3350 17 G/17G
17 POWDER, FOR SOLUTION ORAL
Qty: 30 PACKET | Refills: 0 | Status: SHIPPED | OUTPATIENT
Start: 2018-10-13 | End: 2018-10-13

## 2018-10-13 RX ORDER — ACETAMINOPHEN 500 MG
500 TABLET ORAL
Status: DISCONTINUED | OUTPATIENT
Start: 2018-10-13 | End: 2018-10-13 | Stop reason: HOSPADM

## 2018-10-13 RX ORDER — FAMOTIDINE 20 MG/1
20 TABLET, FILM COATED ORAL 2 TIMES DAILY
Status: DISCONTINUED | OUTPATIENT
Start: 2018-10-13 | End: 2018-10-13

## 2018-10-13 RX ORDER — GUAIFENESIN 100 MG/5ML
81 LIQUID (ML) ORAL DAILY
Qty: 30 TAB | Refills: 0 | Status: SHIPPED | OUTPATIENT
Start: 2018-10-14

## 2018-10-13 RX ORDER — FACIAL-BODY WIPES
10 EACH TOPICAL ONCE
Status: COMPLETED | OUTPATIENT
Start: 2018-10-13 | End: 2018-10-13

## 2018-10-13 RX ORDER — ATORVASTATIN CALCIUM 80 MG/1
80 TABLET, FILM COATED ORAL
Qty: 30 TAB | Refills: 0 | Status: SHIPPED | OUTPATIENT
Start: 2018-10-13 | End: 2018-10-13

## 2018-10-13 RX ORDER — GUAIFENESIN 100 MG/5ML
81 LIQUID (ML) ORAL DAILY
Qty: 30 TAB | Refills: 0 | Status: SHIPPED | OUTPATIENT
Start: 2018-10-14 | End: 2018-10-13

## 2018-10-13 RX ORDER — METOPROLOL SUCCINATE 50 MG/1
50 TABLET, EXTENDED RELEASE ORAL DAILY
Qty: 30 TAB | Refills: 0 | Status: SHIPPED | OUTPATIENT
Start: 2018-10-14 | End: 2018-10-13

## 2018-10-13 RX ORDER — POLYETHYLENE GLYCOL 3350 17 G/17G
17 POWDER, FOR SOLUTION ORAL DAILY
Status: DISCONTINUED | OUTPATIENT
Start: 2018-10-13 | End: 2018-10-13

## 2018-10-13 RX ORDER — POLYETHYLENE GLYCOL 3350 17 G/17G
17 POWDER, FOR SOLUTION ORAL
Qty: 30 PACKET | Refills: 0 | Status: SHIPPED | OUTPATIENT
Start: 2018-10-13

## 2018-10-13 RX ORDER — ACETAMINOPHEN 500 MG
500 TABLET ORAL
Qty: 30 TAB | Refills: 0 | Status: SHIPPED
Start: 2018-10-13

## 2018-10-13 RX ORDER — PANTOPRAZOLE SODIUM 40 MG/10ML
40 INJECTION, POWDER, LYOPHILIZED, FOR SOLUTION INTRAVENOUS EVERY 12 HOURS
Status: DISCONTINUED | OUTPATIENT
Start: 2018-10-13 | End: 2018-10-13 | Stop reason: HOSPADM

## 2018-10-13 RX ORDER — ATORVASTATIN CALCIUM 80 MG/1
80 TABLET, FILM COATED ORAL
Qty: 30 TAB | Refills: 0 | Status: SHIPPED | OUTPATIENT
Start: 2018-10-13 | End: 2018-11-01 | Stop reason: SDUPTHER

## 2018-10-13 RX ORDER — PANTOPRAZOLE SODIUM 40 MG/1
TABLET, DELAYED RELEASE ORAL
Qty: 32 TAB | Refills: 0 | Status: SHIPPED | OUTPATIENT
Start: 2018-10-13 | End: 2018-10-13

## 2018-10-13 RX ORDER — METOPROLOL SUCCINATE 50 MG/1
50 TABLET, EXTENDED RELEASE ORAL DAILY
Qty: 30 TAB | Refills: 0 | Status: SHIPPED | OUTPATIENT
Start: 2018-10-14 | End: 2018-11-01 | Stop reason: SDUPTHER

## 2018-10-13 RX ORDER — PANTOPRAZOLE SODIUM 40 MG/1
TABLET, DELAYED RELEASE ORAL
Qty: 32 TAB | Refills: 0 | Status: SHIPPED | OUTPATIENT
Start: 2018-10-13

## 2018-10-13 RX ADMIN — METOPROLOL SUCCINATE 50 MG: 50 TABLET, EXTENDED RELEASE ORAL at 09:50

## 2018-10-13 RX ADMIN — BISACODYL 10 MG: 10 SUPPOSITORY RECTAL at 09:50

## 2018-10-13 RX ADMIN — VENLAFAXINE 37.5 MG: 37.5 TABLET ORAL at 09:50

## 2018-10-13 RX ADMIN — VENLAFAXINE 37.5 MG: 37.5 TABLET ORAL at 16:39

## 2018-10-13 RX ADMIN — ALUMINUM HYDROXIDE AND MAGNESIUM HYDROXIDE 15 ML: 200; 200 SUSPENSION ORAL at 14:05

## 2018-10-13 RX ADMIN — PANTOPRAZOLE SODIUM 40 MG: 40 INJECTION, POWDER, FOR SOLUTION INTRAVENOUS at 14:05

## 2018-10-13 RX ADMIN — ASPIRIN 81 MG CHEWABLE TABLET 81 MG: 81 TABLET CHEWABLE at 09:50

## 2018-10-13 RX ADMIN — LOSARTAN POTASSIUM 50 MG: 50 TABLET ORAL at 09:51

## 2018-10-13 RX ADMIN — Medication 10 ML: at 09:52

## 2018-10-13 RX ADMIN — FAMOTIDINE 20 MG: 20 TABLET ORAL at 11:03

## 2018-10-13 RX ADMIN — Medication 10 ML: at 14:11

## 2018-10-13 RX ADMIN — HYDROCHLOROTHIAZIDE 12.5 MG: 25 TABLET ORAL at 09:50

## 2018-10-13 RX ADMIN — OMEGA-3 FATTY ACIDS CAP 1000 MG 1 CAPSULE: 1000 CAP at 09:50

## 2018-10-13 RX ADMIN — ISOSORBIDE MONONITRATE 30 MG: 30 TABLET, EXTENDED RELEASE ORAL at 09:50

## 2018-10-13 RX ADMIN — ALUMINUM HYDROXIDE AND MAGNESIUM HYDROXIDE 15 ML: 200; 200 SUSPENSION ORAL at 17:16

## 2018-10-13 RX ADMIN — TICAGRELOR 90 MG: 90 TABLET ORAL at 09:50

## 2018-10-13 NOTE — ROUTINE PROCESS
Patient reports Epigastric pain, hot flashes, cramping, headache,and bloating. Pain is slightly / momentarily relieved   When patient belches or passes gas. MCKENNA Houston Informed orders  Written and carried out  Will continue to monitor.

## 2018-10-13 NOTE — ROUTINE PROCESS
Bedside and Verbal shift change report given to Rose Diane (oncoming nurse) by Wayne Roberts (offgoing nurse). Report included the following information SBAR, OR Summary, Procedure Summary, Intake/Output, Recent Results and Cardiac Rhythm NSR.

## 2018-10-13 NOTE — PROGRESS NOTES
[facility] - Progress Note Admit Date: 10/11/2018 Assessment:  
 
-NSTEMI with troponin to 14 
-s/p PCI to RPDA 10/12/18 
-Echo 10/13/18 with normal EF 
-Constipation, deferring to primary team 
-Hypertensive urgency, /117 on presentation, improved -Hyperlipidemia, prior intolerance to Pravastatin Plan:  
 
Echo reviewed today with normal EF. Patient can discharge from cardiac standpoint on current meds including ASA/Brilinta/BB/statin. I will defer constipation to primary team, please call if questions. Subjective: No new complaints. Objective:  
  
Patient Vitals for the past 8 hrs: 
 Temp Pulse Resp BP SpO2  
10/13/18 0904 - - - 108/73 -  
10/13/18 0832 98.3 °F (36.8 °C) 69 19 108/73 98 % 10/13/18 0344 98.3 °F (36.8 °C) 76 20 125/78 - Patient Vitals for the past 96 hrs: 
 Weight 10/13/18 0904 71.7 kg (158 lb) 10/13/18 0428 72.1 kg (158 lb 14.4 oz) 10/12/18 0420 72.3 kg (159 lb 8 oz) 10/11/18 0918 72.6 kg (160 lb) Intake/Output Summary (Last 24 hours) at 10/13/18 1019 Last data filed at 10/13/18 1019 Gross per 24 hour Intake                0 ml Output              200 ml Net             -200 ml Physical Exam: 
General:  alert, cooperative, no distress, appears stated age Neck:  nontender Lungs:  clear to auscultation bilaterally Heart:  regular rate and rhythm, S1, S2 normal, no murmur, click, rub or gallop Abdomen:  abdomen is soft without significant tenderness, masses, organomegaly or guarding Extremities:  extremities normal, atraumatic, no cyanosis or edema, right wrist ok Data Review:  
 
Labs: Results:  
   
Chemistry Recent Labs 10/12/18 
 0242  10/11/18 
 1621  10/11/18 
 0945 GLU  133*   --   125* NA  138   --   138  
K  3.6   --   3.7 CL  104   --   102 CO2  26   --   29 BUN  8   --   10  
CREA  0.85   --   0.78 CA  9.1   --   9.1 MG   --    --   1.6 AGAP  8   --   7  
 BUCR  9*   --   13  
AP   --   70   --   
TP   --   7.5   --   
ALB   --   4.0   --   
GLOB   --   3.5   --   
AGRAT   --   1.1   --   
  
CBC w/Diff Recent Labs 10/12/18 
 0242  10/11/18 
 0945 WBC  8.1  6.1  
RBC  4.97  5.03  
HGB  13.3  13.7 HCT  38.9  41.0  
PLT  340  269 GRANS  65  69 LYMPH  27  23 EOS  1  1 Cardiac Enzymes No results found for: CPK, CK, CKMMB, CKMB, RCK3, CKMBT, CKNDX, CKND1, OLAMIDE, TROPT, TROIQ, KIKO, TROPT, TNIPOC, BNP, BNPP Coagulation Recent Labs 10/12/18 
 0242  10/11/18 
 1859 APTT  83.3*  38.0* Lipid Panel Lab Results Component Value Date/Time Cholesterol, total 339 (H) 10/12/2018 02:42 AM  
 HDL Cholesterol 78 (H) 10/12/2018 02:42 AM  
 LDL, calculated 236.4 (H) 10/12/2018 02:42 AM  
 VLDL, calculated 24.6 10/12/2018 02:42 AM  
 Triglyceride 123 10/12/2018 02:42 AM  
 CHOL/HDL Ratio 4.3 10/12/2018 02:42 AM  
  
BNP No results found for: BNP, BNPP, XBNPT Liver Enzymes Recent Labs 10/11/18 
 1621 TP  7.5 ALB  4.0 AP  70 SGOT  71* Digoxin Thyroid Studies Lab Results Component Value Date/Time TSH 2.94 08/13/2010 03:21 PM  
    
 
Signed By: Robin Lee MD   
 October 13, 2018

## 2018-10-13 NOTE — ROUTINE PROCESS
Bedside and Verbal shift change report given to Trace Holm (oncoming nurse) by Richar Waters (offgoing nurse). Report included the following information SBAR, Procedure Summary, Intake/Output, MAR and Cardiac Rhythm Sinus Rhythm.

## 2018-10-13 NOTE — DISCHARGE SUMMARY
[facility]  [facility]       Patient: Jalen Esparza Age: 59 y.o. : 1955 MR#: [id number] SSN: [id number]  PCP on record: Connie Jain MD  Admit date: 10/11/2018  Discharge date: 10/13/2018    Disposition:    []Home   [x]Home with Home Health   []SNF/NH   []Rehab   []Home with family   []Alternate Facility:____________________    Discharge Diagnoses:                             1. NSTEMI s/p stent placement   2. Hypertensive urgency   3. Hyperlipidemia   4. Pre- diabetes   5. Abdominal Bloating     Discharge Medications:     Current Discharge Medication List      START taking these medications    Details   acetaminophen (TYLENOL) 500 mg tablet Take 1 Tab by mouth every six (6) hours as needed. Qty: 30 Tab, Refills: 0      aluminum-magnesium hydroxide (MAALOX) 200-200 mg/5 mL suspension Take 15 mL by mouth three (3) times daily (with meals) for 5 days. Qty: 225 mL, Refills: 0      aspirin 81 mg chewable tablet Take 1 Tab by mouth daily. Qty: 30 Tab, Refills: 0      atorvastatin (LIPITOR) 80 mg tablet Take 1 Tab by mouth nightly. Qty: 30 Tab, Refills: 0      isosorbide mononitrate ER (IMDUR) 30 mg tablet Take 1 Tab by mouth daily. Qty: 30 Tab, Refills: 0      metoprolol succinate (TOPROL-XL) 50 mg XL tablet Take 1 Tab by mouth daily. Qty: 30 Tab, Refills: 0      pantoprazole (PROTONIX) 40 mg tablet Take one tablet by mouth before breakfast and dinner daily for two days an then daily before dinner  Qty: 32 Tab, Refills: 0      polyethylene glycol (MIRALAX) 17 gram packet Take 1 Packet by mouth daily as needed (constipation). Qty: 30 Packet, Refills: 0      ticagrelor (BRILINTA) 90 mg tablet Take 1 Tab by mouth every twelve (12) hours every twelve (12) hours.   Qty: 60 Tab, Refills: 0      OTHER This is to certify that family member for Tamikareta Siegel is currently admitted to DR. STEPHANIA PERSAUD and Stephanie Hartman has been here at the hospital on 10/12/2018    Please feel free to contact my office if you have any questions or concerns. Thank you. Qty: 1 Each, Refills: 0         CONTINUE these medications which have NOT CHANGED    Details   losartan-hydroCHLOROthiazide (HYZAAR) 50-12.5 mg per tablet Take 1 Tab by mouth daily. venlafaxine (EFFEXOR) 37.5 mg tablet Take 37.5 mg by mouth three (3) times daily. omega 3-DHA-EPA-fish oil 1,000 mg (120 mg-180 mg) capsule Take 1 Cap by mouth daily. Cetirizine 10 mg cap Take  by mouth. STOP taking these medications       pravastatin (PRAVACHOL) 40 mg tablet Comments:   Reason for Stopping:             Consults:    - Cardiology     Procedures:  -   Left Heart Cardiac Catheterization:   Conclusion   · Distal LM 35%  · Ostial LAD 40%. · Small diffusely diseased Cx system  · culprit ostial RPDA hazy thrombotic 99% dissected lesion stented to 0% with ELIZABETH  · distal AV small AV Cx 100%, collateralized. · EF 48% with inferior posterior RWMA     Significant Diagnostic Studies:   -  Duplex Carotid Bilateral on 10/11/2018   Right Carotid   The right CCA is patent. There is intimal thickening present in the right CCA. There is mild stenosis in the right ICA (<50%). The right ICA has moderate and homogeneous plaque. The right ECA is patent. The right vertebral is antegrade. The right subclavian is normal.      Left Carotid   The left CCA is patent. There is intimal thickening present in the left CCA. There is mild stenosis in the left ICA (<50%). The left ICA has mild and heterogeneous plaque. The left ECA is patent. The left vertebral is antegrade. The left subclavian is normal.        XR ABD (KUB) on 10/13/2018  IMPRESSION:     Nonobstructive bowel gas pattern. No evidence of constipation. Echo Adult Complete on 10/13/2018  · Normal cavity size and systolic function (ejection fraction normal). Moderate concentric hypertrophy observed. The estimated ejection fraction is 65%.  No regional wall motion abnormality noted. · Left ventricular moderate concentric hypertrophy. Normal left ventricular wall motion, no regional wall motion abnormality noted. Age-appropriate left ventricular diastolic function. Hospital Course by Problem   1. NSTEMI s/p stent placement - patient admitted with complaint of epigastric and lower chest pain with finding of troponins of 0.47>14.5 and 10.80 for which patient was taken for right heart catheterization with single stent placement on 10/12/2018. Additionally patient with age appropriate changes per echocardiogram on day of discharge. Patient discharge on ASA, Brilinta (card given to patient), BB and statin per cardiology recommendations  2. Hypertensive urgency - significant elevation on admission in setting of acute illness. Medications were adjusted while inpatient and to continue with hyzaar, BB, imdur upon discharge. cont hctz, imdur, BB, cozaar. BP ranging 100's-130's/70's on day of discharge. 3. Hyperlipidemia -  upon admission. Patient reports not compliant with pravastatin prior to admission as felt was causing abdominal bloating. Tolterating high dose lipitor during admission. Continue lipitor and omega 3 on discharge. Dietary changes discussed. 4. Pre- diabetes - A1c 6.0 on admission, follow as outpatient  5. Abdominal Bloating - Reports long term issue ? Related to taking several NSAIDs last year following dental work. Ultimately appears likely related to constipation and GERD symptoms for which patient was given suppository and protonix / maalox during admission with good response. Continue upon discharge. Encouraged taking medications with food.       Today's examination of the patient revealed:     Subjective:   No CP, SOB, n/v/constipation; abdominal bloating improved  Objective:   VS:   Visit Vitals    /70 (BP 1 Location: Right arm, BP Patient Position: At rest)    Pulse 64    Temp 99.8 °F (37.7 °C)    Resp 18    Ht 5' 1\" (1.549 m)    Wt 71.7 kg (158 lb)    SpO2 98%    Breastfeeding No    BMI 29.85 kg/m2      Tmax/24hrs: Temp (24hrs), Av.5 °F (36.9 °C), Min:97.6 °F (36.4 °C), Max:99.8 °F (37.7 °C)     Input/Output:   Intake/Output Summary (Last 24 hours) at 10/13/18 1737  Last data filed at 10/13/18 1019   Gross per 24 hour   Intake                0 ml   Output              200 ml   Net             -200 ml       General:  Alert, NAD  Cardiovascular:  RRR  Pulmonary:  LSC throughout  GI:  +BS in all four quadrants, soft, non-tender  Extremities:  No edema; 2+ dorsalis pedis pulses bilaterally  Neurology: Patient A&O x 4    Labs:    Recent Results (from the past 24 hour(s))   EKG, 12 LEAD, SUBSEQUENT    Collection Time: 10/13/18  7:16 AM   Result Value Ref Range    Ventricular Rate 67 BPM    Atrial Rate 67 BPM    P-R Interval 194 ms    QRS Duration 100 ms    Q-T Interval 458 ms    QTC Calculation (Bezet) 483 ms    Calculated P Axis 27 degrees    Calculated R Axis -62 degrees    Calculated T Axis -67 degrees    Diagnosis       Normal sinus rhythm  Left axis deviation  Moderate voltage criteria for LVH, may be normal variant  Inferior infarct (cited on or before 12-OCT-2018)  T wave abnormality, consider lateral ischemia  Abnormal ECG  Confirmed by Lady Chris MD, [facility] ([id number]) on 10/13/2018 11:33:19 AM     ECHO ADULT COMPLETE    Collection Time: 10/13/18  9:35 AM   Result Value Ref Range    LA Volume 49.92 22 - 52 mL    Ao Root D 2.56 cm    LVIDd 4.31 3.9 - 5.3 cm    LVPWd 1.19 (A) 0.6 - 0.9 cm    LVIDs 2.46 cm    IVSd 1.28 (A) 0.6 - 0.9 cm    LVOT d 1.89 cm    LVOT Peak Velocity 119.46 cm/s    LVOT Peak Gradient 5.7 mmHg    LVOT VTI 22.99 cm    MV A Henrry 96.32 cm/s    MV E Henrry 0.63 cm/s    MV E/A 0.01     LA Vol 4C 40.37 22 - 52 mL    LA Vol 2C 51.60 22 - 52 mL    LA Area 4C 18.0 cm2    LV Mass .9 (A) 67 - 162 g    LV Mass AL Index 133.9 (A) g/m2    Mitral Valve E Wave Deceleration Time 250.9 ms    LA Vol Index 29.21 ml/m2 LA Vol Index 30.19 ml/m2    LA Vol Index 73.85 ml/m2   METABOLIC PANEL, BASIC    Collection Time: 10/13/18 10:26 AM   Result Value Ref Range    Sodium 137 136 - 145 mmol/L    Potassium 3.4 (L) 3.5 - 5.5 mmol/L    Chloride 102 100 - 108 mmol/L    CO2 28 21 - 32 mmol/L    Anion gap 7 3.0 - 18 mmol/L    Glucose 123 (H) 74 - 99 mg/dL    BUN 10 7.0 - 18 MG/DL    Creatinine 1.00 0.6 - 1.3 MG/DL    BUN/Creatinine ratio 10 (L) 12 - 20      GFR est AA >60 >60 ml/min/1.73m2    GFR est non-AA 56 (L) >60 ml/min/1.73m2    Calcium 9.0 8.5 - 10.1 MG/DL   MAGNESIUM    Collection Time: 10/13/18 10:26 AM   Result Value Ref Range    Magnesium 1.9 1.6 - 2.6 mg/dL   PHOSPHORUS    Collection Time: 10/13/18 10:26 AM   Result Value Ref Range    Phosphorus 3.3 2.5 - 4.9 MG/DL   HEPATIC FUNCTION PANEL    Collection Time: 10/13/18 10:26 AM   Result Value Ref Range    Protein, total 7.4 6.4 - 8.2 g/dL    Albumin 3.6 3.4 - 5.0 g/dL    Globulin 3.8 2.0 - 4.0 g/dL    A-G Ratio 0.9 0.8 - 1.7      Bilirubin, total 0.6 0.2 - 1.0 MG/DL    Bilirubin, direct 0.1 0.0 - 0.2 MG/DL    Alk. phosphatase 64 45 - 117 U/L    AST (SGOT) 32 15 - 37 U/L    ALT (SGPT) 25 13 - 56 U/L   LIPASE    Collection Time: 10/13/18 10:26 AM   Result Value Ref Range    Lipase 155 73 - 393 U/L     Additional Data Reviewed:     Condition:   Follow-up Appointments:   1. Dr. Tyler Gordillo 1 week ().    2. Dr. Aj Nolasco 4 weeks    >30 minutes spent coordinating this discharge (review instructions/follow-up, prescriptions, preparing report for sign off)    Signed:  Luis Sampson NP  10/13/2018  5:37 PM

## 2018-10-13 NOTE — DISCHARGE INSTRUCTIONS
Chest Pain: Care Instructions  Your Care Instructions    There are many things that can cause chest pain. Some are not serious and will get better on their own in a few days. But some kinds of chest pain need more testing and treatment. Your doctor may have recommended a follow-up visit in the next 8 to 12 hours. If you are not getting better, you may need more tests or treatment. Even though your doctor has released you, you still need to watch for any problems. The doctor carefully checked you, but sometimes problems can develop later. If you have new symptoms or if your symptoms do not get better, get medical care right away. If you have worse or different chest pain or pressure that lasts more than 5 minutes or you passed out (lost consciousness), call 911 or seek other emergency help right away. A medical visit is only one step in your treatment. Even if you feel better, you still need to do what your doctor recommends, such as going to all suggested follow-up appointments and taking medicines exactly as directed. This will help you recover and help prevent future problems. How can you care for yourself at home? · Rest until you feel better. · Take your medicine exactly as prescribed. Call your doctor if you think you are having a problem with your medicine. · Do not drive after taking a prescription pain medicine. When should you call for help? Call 911 if:    · You passed out (lost consciousness).     · You have severe difficulty breathing.     · You have symptoms of a heart attack. These may include:  ¨ Chest pain or pressure, or a strange feeling in your chest.  ¨ Sweating. ¨ Shortness of breath. ¨ Nausea or vomiting. ¨ Pain, pressure, or a strange feeling in your back, neck, jaw, or upper belly or in one or both shoulders or arms. ¨ Lightheadedness or sudden weakness. ¨ A fast or irregular heartbeat.   After you call 911, the  may tell you to chew 1 adult-strength or 2 to 4 low-dose aspirin. Wait for an ambulance. Do not try to drive yourself.    Call your doctor today if:    · You have any trouble breathing.     · Your chest pain gets worse.     · You are dizzy or lightheaded, or you feel like you may faint.     · You are not getting better as expected.     · You are having new or different chest pain. Where can you learn more? Go to http://mauricio-pablo.info/. Enter A120 in the search box to learn more about \"Chest Pain: Care Instructions. \"  Current as of: November 20, 2017  Content Version: 11.8  © 3552-0112 [employer]. Care instructions adapted under license by [employer] (which disclaims liability or warranty for this information). If you have questions about a medical condition or this instruction, always ask your healthcare professional. [address] any warranty or liability for your use of this information.

## 2018-10-13 NOTE — PROGRESS NOTES
[facility] provided for this pt who reluctantly agreed to home health Services for discharge. Pt decided to agree to [facility] only for follow-up services. [address] Naik [phone]

## 2018-10-13 NOTE — PROGRESS NOTES
Patient discharged home in stable condition with  to provide transportation. AVS and medications/prescriptions were provided and discussed. Patient has no questions at this time.

## 2018-10-14 RX ORDER — POTASSIUM CHLORIDE 20 MEQ/1
20 TABLET, EXTENDED RELEASE ORAL DAILY
Qty: 5 TAB | Refills: 0 | Status: SHIPPED | OUTPATIENT
Start: 2018-10-14 | End: 2018-10-19

## 2018-10-15 NOTE — HOME CARE
Discharge noted over the weekend, [facility] will follow for [facility] visit, referral processed by [facility] central intake over the weekend. AVELINA ESPARZA.

## 2018-10-17 ENCOUNTER — HOME CARE VISIT (OUTPATIENT)
Dept: HOME HEALTH SERVICES | Facility: HOME HEALTH | Age: 63
End: 2018-10-17

## 2018-10-17 ENCOUNTER — HOME CARE VISIT (OUTPATIENT)
Dept: SCHEDULING | Facility: HOME HEALTH | Age: 63
End: 2018-10-17

## 2018-10-17 PROCEDURE — G0299 HHS/HOSPICE OF RN EA 15 MIN: HCPCS

## 2018-10-18 ENCOUNTER — TELEPHONE (OUTPATIENT)
Dept: CARDIAC REHAB | Age: 63
End: 2018-10-18

## 2018-10-18 NOTE — TELEPHONE ENCOUNTER
[facility] called patient and spoke to her about the program. She is interested and wants to see what her insurance covers. Will follow up with benefit information.     Thank you,  Jack Tran

## 2018-10-26 ENCOUNTER — TELEPHONE (OUTPATIENT)
Dept: CARDIAC REHAB | Age: 63
End: 2018-10-26

## 2018-10-26 NOTE — TELEPHONE ENCOUNTER
[facility] called patient and explained her insurance benefits.  She declined due to cost.    Thank you,  Luba Izaguirre

## 2018-10-31 NOTE — PROGRESS NOTES
Rafaela Alcantara presents today for a post-hospital follow-up. She was hospitalized from 10/11/18 through 10/13/18. She presented with complaints of left sided neck pain with vomiting and epigastric pain that occurred 2 days prior to presentation. It occurred again the morning of presentation and she went to the ER for further evaluation. Her initial troponin was 0.47 and peaked at 14.50. Her cholesterol was 339 and LDL was 236. She was hypertensive in the ER with blood pressures in the 190's/110-120 mmHg. She had an echo done in Oct. 2018, and it showed an EF of 65%, no WMA, moderate concentric LVH, and age appropriate LV diastolic function. She underwent cardiac catheterization on 10/12/18 and it showed:  ·  Distal LM 35%  · Ostial LAD 40%. · Small diffusely diseased Cx system  · culprit ostial RPDA hazy thrombotic 99% dissected lesion stented to 0% with ELIZABETH  · distal AV small AV Cx 100%, collateralized. EF 48% with inferior posterior RWMA    She underwent successful PCI/stenting to the ostial RPDA hazy, thrombotic 99% lesion with a  2.75 x 15 mm Xience ELIZABETH. She is a 59year old female with history of hypertension, hyperlipidemia (intolerant to pravastatin), pre-diabetes. Since being discharged home, she states that she has been doing fairly well. On occasion, she has noticed some mild shortness of breath after taking her Brilinta. She describes it as feeling like she has to take a deep breath. Denies chest pain, tightness, heaviness, and palpitations. Denies shortness of breath at rest, dyspnea on exertion, orthopnea and PND. Denies abdominal bloating. Denies lightheadedness, dizziness, and syncope. Denies lower extremity edema and claudication. Denies nausea, vomiting, diarrhea, melena, hematochezia. Denies hematuria, urgency, frequency. Denies fever, chills.         PMH:  Past Medical History:   Diagnosis Date    Headache     High cholesterol     Hypertension PSH:  Past Surgical History:   Procedure Laterality Date    HX OTHER SURGICAL      right fallopian tube removal    HX PARTIAL HYSTERECTOMY         MEDS:  Current Outpatient Medications   Medication Sig    atorvastatin (LIPITOR) 80 mg tablet Take 1 Tab by mouth nightly.  isosorbide mononitrate ER (IMDUR) 30 mg tablet Take 1 Tab by mouth daily.  metoprolol succinate (TOPROL-XL) 50 mg XL tablet Take 1 Tab by mouth daily.  ticagrelor (BRILINTA) 90 mg tablet Take 1 Tab by mouth every twelve (12) hours every twelve (12) hours.  aspirin 81 mg chewable tablet Take 1 Tab by mouth daily.  pantoprazole (PROTONIX) 40 mg tablet Take one tablet by mouth before breakfast and dinner daily for two days an then daily before dinner    losartan-hydroCHLOROthiazide (HYZAAR) 50-12.5 mg per tablet Take 1 Tab by mouth daily.  venlafaxine (EFFEXOR) 37.5 mg tablet Take 37.5 mg by mouth daily.  acetaminophen (TYLENOL) 500 mg tablet Take 1 Tab by mouth every six (6) hours as needed.  polyethylene glycol (MIRALAX) 17 gram packet Take 1 Packet by mouth daily as needed (constipation).  OTHER This is to certify that family member for Arcelia Carvalho is currently admitted to [facility] and Joya Evert has been here at the hospital on 10/12/2018    Please feel free to contact my office if you have any questions or concerns. Thank you.  omega 3-DHA-EPA-fish oil 1,000 mg (120 mg-180 mg) capsule Take 1 Cap by mouth daily.  Cetirizine 10 mg cap Take  by mouth. No current facility-administered medications for this visit. Allergies and Sensitivities:  No Known Allergies    Family History:  No family history on file. Social History:  She  reports that  has never smoked. she has never used smokeless tobacco.  She  reports that she drinks alcohol.       Physical:  Visit Vitals  /86   Pulse (!) 59   Ht 5' 1\" (1.549 m)   Wt 72.1 kg (159 lb)   SpO2 98%   BMI 30.04 kg/m² Exam:  Neck:  Supple, no JVD, no carotid bruits  CV:  Normal S1 and  S2, no murmurs, rubs, or gallops noted  Lungs:  Clear to ausculation throughout, no wheezes or rales  Abd:  Soft, non-tender, non-distended with good bowel sounds. No hepatosplenomegaly  Extremities:  No edema      Data:  EKG:  Read by Adi Black, DO - Sinus bradycardia.  Left axis.  Anterior fascicular block.  Voltage criteria for LVH.  Anterolateral infarct, age undetermined.  Extensive T-abnormality.  Possible anterolateral and inferior ischemia. LABS:  Lab Results   Component Value Date/Time    Sodium 137 10/13/2018 10:26 AM    Potassium 3.4 (L) 10/13/2018 10:26 AM    Chloride 102 10/13/2018 10:26 AM    CO2 28 10/13/2018 10:26 AM    Glucose 123 (H) 10/13/2018 10:26 AM    BUN 10 10/13/2018 10:26 AM    Creatinine 1.00 10/13/2018 10:26 AM     Lab Results   Component Value Date/Time    Cholesterol, total 339 (H) 10/12/2018 02:42 AM    HDL Cholesterol 78 (H) 10/12/2018 02:42 AM    LDL, calculated 236.4 (H) 10/12/2018 02:42 AM    Triglyceride 123 10/12/2018 02:42 AM    CHOL/HDL Ratio 4.3 10/12/2018 02:42 AM     Lab Results   Component Value Date/Time    ALT (SGPT) 25 10/13/2018 10:26 AM         Impression/Plan:  1.  CAD, s/p non-STEMI, and successful PCI/stenting to the ostial RPDA hazy, thrombotic 99% lesion with a  2.75 x 15 mm Xience ELIZABETH. 2.  Essential hypertension, blood pressure stable  3. Hyperlipidemia, on atorvastatin 80mg, tolerating well thus far  4. Pre-diabetes with Hgb A1c of 6.0    Mrs. Still was seen today for a post-hospital follow-up. She is s/p non-STEMI and PCI/stenting to the ostial RPDA hazy, thrombotic 99% lesion with a  2.75 x 15 mm Xience ELIZABETH. She has been doing well since discharge and \"just trying to get adjusted to everything that has gone on, taking more medications, and making dietary changes. \"    She has noticed some mild, transient shortness of breath which occurs occasionally after taking Brilinta. It does not last long and does not occur all the time. She states that it is tolerable. She has not had any chest pain or symptoms similar to what was occurring at presentation. Her blood pressure is stable. Diastolic pressure is just slightly elevated but she is somewhat anxious about today's visit and had a lot of questions. The remainder of her physical exam is negative. She does not exhibit any signs of volume overload. Findings of the cardiac cath and echocardiogram discussed at length with her and her . She asked that I explain what the elevation in troponin meant. We also discussed her lipid panel. Teaching done today re:  CAD, non-STEMI, PCI/stenting, medications, importance of optimal blood pressure control, heart healthy and low sodium diet, weight loss, and importance of exercise. Patient education material re: CAD, PCI, heart healthy diet, low sodium diet was attached to her after visit summary. Greater than 50% of a 45 minute visit was spent in discussion, counseling, and answering questions. Follow-up with Dr. Eduard Arzate as scheduled and as needed. Pablo Hunt MSN, FNP-BC    Please note:  Portions of this chart were created with Dragon medical speech to text program.  Unrecognized errors may be present.

## 2018-11-01 ENCOUNTER — OFFICE VISIT (OUTPATIENT)
Dept: CARDIOLOGY CLINIC | Age: 63
End: 2018-11-01

## 2018-11-01 VITALS
HEART RATE: 59 BPM | SYSTOLIC BLOOD PRESSURE: 130 MMHG | DIASTOLIC BLOOD PRESSURE: 84 MMHG | OXYGEN SATURATION: 98 % | HEIGHT: 61 IN | BODY MASS INDEX: 30.02 KG/M2 | WEIGHT: 159 LBS

## 2018-11-01 DIAGNOSIS — R07.89 ATYPICAL CHEST PAIN: ICD-10-CM

## 2018-11-01 DIAGNOSIS — I25.10 CORONARY ARTERY DISEASE INVOLVING NATIVE CORONARY ARTERY OF NATIVE HEART WITHOUT ANGINA PECTORIS: ICD-10-CM

## 2018-11-01 DIAGNOSIS — R77.8 ELEVATED TROPONIN: ICD-10-CM

## 2018-11-01 DIAGNOSIS — I21.4 NON-ST ELEVATED MYOCARDIAL INFARCTION (NON-STEMI) (HCC): Primary | ICD-10-CM

## 2018-11-01 RX ORDER — METOPROLOL SUCCINATE 50 MG/1
50 TABLET, EXTENDED RELEASE ORAL DAILY
Qty: 90 TAB | Refills: 3 | Status: SHIPPED | OUTPATIENT
Start: 2018-11-01

## 2018-11-01 RX ORDER — ATORVASTATIN CALCIUM 80 MG/1
80 TABLET, FILM COATED ORAL
Qty: 90 TAB | Refills: 3 | Status: SHIPPED | OUTPATIENT
Start: 2018-11-01 | End: 2022-06-22 | Stop reason: SDUPTHER

## 2018-11-01 RX ORDER — ISOSORBIDE MONONITRATE 30 MG/1
30 TABLET, EXTENDED RELEASE ORAL DAILY
Qty: 90 TAB | Refills: 3 | Status: SHIPPED | OUTPATIENT
Start: 2018-11-01 | End: 2022-07-01 | Stop reason: ALTCHOICE

## 2018-11-01 NOTE — PATIENT INSTRUCTIONS
Continue present medication regimen  Take Aspirin and Brilinta as prescribed without fail  Follow-up with Dr. Chong Keene as scheduled and as needed. Heart Attack: Care Instructions  Your Care Instructions    A heart attack (myocardial infarction, or MI) occurs when one or more of the coronary arteries, which supply the heart with oxygen-rich blood, is blocked. A blockage usually occurs when plaque inside the artery breaks open and a blood clot forms in the artery. After a heart attack, you may be worried about your future. Over the next several weeks, your heart will start to heal. Though it can be hard to break old habits, you can prevent another heart attack by making some lifestyle changes and by taking medicines. You may use this information for ideas about what to do at home to speed your recovery. Follow-up care is a key part of your treatment and safety. Be sure to make and go to all appointments, and call your doctor if you are having problems. It's also a good idea to know your test results and keep a list of the medicines you take. How can you care for yourself at home? Activity    · Until your doctor says it is okay, do not do strenuous exercise. And do not lift, pull, or push anything heavy. Ask your doctor what types of activities are safe for you.     · If your doctor has not set you up with a cardiac rehabilitation (rehab) program, talk to him or her about whether that is right for you. Cardiac rehab includes supervised exercise. It also includes help with diet and lifestyle changes and emotional support. It may reduce your risk of future heart problems.     · Increase your activities slowly. Take short rest breaks when you get tired.     · If your doctor recommends it, get more exercise. Walking is a good choice. Bit by bit, increase the amount you walk every day. Try for at least 30 minutes on most days of the week. You also may want to swim, bike, or do other activities.  Talk with your doctor before you start an exercise program to make sure it is safe for you.     · Ask your doctor when you can drive, go back to work, and do other daily activities again.     · You can have sex as soon as you feel ready for it. Often this means when you can easily walk around or climb stairs. Talk with your doctor if you have any concerns. If you are taking nitroglycerin, do not take erection-enhancing medicine such as sildenafil (Viagra), tadalafil (Cialis), or vardenafil (Levitra) .    Lifestyle changes    · Do not smoke. Smoking increases your risk of another heart attack. If you need help quitting, talk to your doctor about stop-smoking programs and medicines. These can increase your chances of quitting for good.     · Eat a heart-healthy diet that is low in saturated fat and salt, and is full of fruits, vegetables and whole-grains. Eat at least two servings of fish each week. You may get more details about how to eat healthy. But these tips can help you get started.     · Stay at a healthy weight, or lose weight if you need to. Medicines    · Be safe with medicines. Take your medicines exactly as prescribed. Call your doctor if you think you are having a problem with your medicine. You will get more details on the specific medicines your doctor prescribes. Do not stop taking your medicine unless your doctor tells you to. Not taking your medicine might raise your risk of having another heart attack.     · You may need several medicines to help lower your risk of another heart attack. These include:  ? Blood pressure medicines such as angiotensin-converting enzyme (ACE) inhibitors, ARBs (angiotensin II receptor blockers), and beta-blockers. ? Cholesterol medicine called statins. ? Aspirin and other blood thinners. These prevent blood clots that can cause a heart attack.     · If your doctor has given you nitroglycerin, keep it with you at all times.  If you have angina symptoms, such as chest pain or pressure, sit down and rest. Take the first dose of nitroglycerin as directed. If symptoms get worse or are not getting better within 5 minutes, call 911 right away. Stay on the phone. The emergency  will tell you what to do.     · Do not take any over-the-counter medicines, vitamins, or herbal products without talking to your doctor first.    Staying healthy    · Manage other health conditions such as high blood pressure and diabetes.     · Avoid colds and flu. Get a pneumococcal vaccine shot. If you have had one before, ask your doctor whether you need another dose. Get the flu vaccine every year. If you must be around people with colds or flu, wash your hands often.     · Be sure to tell your doctor about any angina symptoms you have had, even if they went away. Pay attention to your angina symptoms. Know what is typical for you and learn how to control it. Know when to call for help.     · Talk to your family, friends, or a counselor about your feelings. It is normal to feel frightened, angry, hopeless, helpless, and even guilty. Talking openly about bad feelings can help you cope. If you have symptoms of depression, talk to your doctor. When should you call for help? Call 911 anytime you think you may need emergency care. For example, call if:    · You have symptoms of a heart attack. These may include:  ? Chest pain or pressure, or a strange feeling in the chest.  ? Sweating. ? Shortness of breath. ? Nausea or vomiting. ? Pain, pressure, or a strange feeling in the back, neck, jaw, or upper belly or in one or both shoulders or arms. ? Lightheadedness or sudden weakness. ? A fast or irregular heartbeat. After you call 911, the  may tell you to chew 1 adult-strength or 2 to 4 low-dose aspirin. Wait for an ambulance.  Do not try to drive yourself.     · You have angina symptoms (such as chest pain or pressure) that do not go away with rest or are not getting better within 5 minutes after you take a dose of nitroglycerin.     · You passed out (lost consciousness).     · You feel like you are having another heart attack.    Call your doctor now or seek immediate medical care if:    · You are having angina symptoms, such as chest pain or pressure, more often than usual, or the symptoms are different or worse than usual.     · You have new or increased shortness of breath.     · You are dizzy or lightheaded, or you feel like you may faint.    Watch closely for changes in your health, and be sure to contact your doctor if you have any problems. Where can you learn more? Go to http://mauricioCovaritypablo.info/. Enter [id number] in the search box to learn more about \"Heart Attack: Care Instructions. \"  Current as of: December 6, 2017  Content Version: 11.8  © 7315-5157 [employer]. Care instructions adapted under license by [employer] (which disclaims liability or warranty for this information). If you have questions about a medical condition or this instruction, always ask your healthcare professional. [place] [address] any warranty or liability for your use of this information. Learning About Percutaneous Coronary Intervention  What is percutaneous coronary intervention? Percutaneous coronary intervention (PCI) is the name for procedures to open a blocked coronary artery. The two most common are coronary angioplasty and coronary stent placement. A PCI is a way to open a blocked coronary artery before, during, or after a heart attack. It gets blood flowing to your heart. And it can help prevent heart problems by widening an artery that has been narrowed by fatty buildup (plaque). This also may be called balloon angioplasty. Before a PCI, a doctor does a test to find blocked arteries. The test is called cardiac catheterization. The doctor puts a tiny tube called a catheter into an artery in your groin or arm.  The doctor moves the catheter through the artery to your heart. Then he or she puts a dye into the catheter. This makes your heart's arteries show up on a screen so the doctor can see any blockages. The test also can measure the pressure inside your heart's chambers. If you have a blocked artery, the doctor may do an angioplasty or a coronary stent procedure. In an angioplasty, the doctor uses a catheter with a tiny balloon at the tip. He or she puts it into the blocked area and inflates it. The balloon presses the plaque against the walls of the artery. This makes more room for blood to flow. In most cases, the doctor then puts a stent in the artery. A stent is a small, expandable tube that presses against the walls of the artery. The stent is left in the artery to keep the artery open. This helps blood flow. It also may keep small pieces of plaque from breaking off and causing a heart attack. How is PCI done? PCI is done in a cardiac catheterization laboratory (\"cath lab\"). It is done by a heart specialist called a cardiologist. The whole procedure may take 1½ to 3 hours. You lie on a table under a large X-ray machine. You will get medicine through an IV in one of your veins. It helps you relax and not feel pain. You will be awake during the procedure. But you may not be able to remember much about it. The doctor will inject some medicine into your arm or groin to numb the skin. You will feel a small needle stick. It's like having a blood test. You may feel some pressure when the doctor puts in the catheter. But you will not feel pain. The doctor will look at X-ray pictures on a monitor (like a TV set) to move the catheter to your heart. You may feel warm or flushed for a short time when the doctor injects dye into your artery. The doctor then will inflate a tiny balloon at the end of the catheter. The balloon is inflated for a brief time. Then it is deflated and removed. The doctor also may use the catheter to put a stent in the artery.   What can you expect after PCI?  The catheter will be removed. A nurse or doctor may press on a bandage on the opening. Then a bandage or a compression device may be placed on your groin or wrist at the catheter insertion site. This prevents bleeding. After the test, you will be taken to a room where the catheter site and your heart rate, blood pressure, and temperature will be checked several times. If the catheter was put in your groin, you will need to lie still and keep your leg straight for several hours. If the catheter was put in your arm, you may be able to sit up and get out of bed right away. But you will need to keep your arm still for at least one hour. The average hospital stay is 1 to 2 days for most procedures. When you go home, you will get instructions from your doctor to help you recover well and prevent problems. Make sure to drink plenty of fluids (unless your doctor tells you not to) for several hours after the test. This will help flush the dye out of your body. Your doctor will prescribe blood-thinning medicines. You will likely take aspirin plus another blood thinner. It is very important that you take these medicines exactly as directed. They help keep the coronary artery open and reduce your risk of a heart attack. If you have this procedure, you will still need to make lifestyle changes like eating healthy, being active, and not smoking. This will give you the best chance for a longer, healthier life. Follow-up care is a key part of your treatment and safety. Be sure to make and go to all appointments, and call your doctor if you are having problems. It's also a good idea to know your test results and keep a list of the medicines you take. Where can you learn more? Go to http://mauricio-pablo.info/. Enter I522 in the search box to learn more about \"Learning About Percutaneous Coronary Intervention. \"  Current as of: December 6, 2017  Content Version: 11.8  © 5662-0800 [employer]. Care instructions adapted under license by [employer] (which disclaims liability or warranty for this information). If you have questions about a medical condition or this instruction, always ask your healthcare professional. [address] any warranty or liability for your use of this information. Heart-Healthy Diet: Care Instructions  Your Care Instructions    A heart-healthy diet has lots of vegetables, fruits, nuts, beans, and whole grains, and is low in salt. It limits foods that are high in saturated fat, such as meats, cheeses, and fried foods. It may be hard to change your diet, but even small changes can lower your risk of heart attack and heart disease. Follow-up care is a key part of your treatment and safety. Be sure to make and go to all appointments, and call your doctor if you are having problems. It's also a good idea to know your test results and keep a list of the medicines you take. How can you care for yourself at home? Watch your portions  · Learn what a serving is. A \"serving\" and a \"portion\" are not always the same thing. Make sure that you are not eating larger portions than are recommended. For example, a serving of pasta is ½ cup. A serving size of meat is 2 to 3 ounces. A 3-ounce serving is about the size of a deck of cards. Measure serving sizes until you are good at [place]" them. Keep in mind that restaurants often serve portions that are 2 or 3 times the size of one serving. · To keep your energy level up and keep you from feeling hungry, eat often but in smaller portions. · Eat only the number of calories you need to stay at a healthy weight. If you need to lose weight, eat fewer calories than your body burns (through exercise and other physical activity). Eat more fruits and vegetables  · Eat a variety of fruit and vegetables every day. Dark green, deep orange, red, or yellow fruits and vegetables are especially good for you. Examples include spinach, carrots, peaches, and berries. · Keep carrots, celery, and other veggies handy for snacks. Buy fruit that is in season and store it where you can see it so that you will be tempted to eat it. · Cook dishes that have a lot of veggies in them, such as stir-fries and soups. Limit saturated and trans fat  · Read food labels, and try to avoid saturated and trans fats. They increase your risk of heart disease. Trans fat is found in many processed foods such as cookies and crackers. · Use olive or canola oil when you cook. Try cholesterol-lowering spreads, such as Benecol or Take Control. · Bake, broil, grill, or steam foods instead of frying them. · Choose lean meats instead of high-fat meats such as hot dogs and sausages. Cut off all visible fat when you prepare meat. · Eat fish, skinless poultry, and meat alternatives such as soy products instead of high-fat meats. Soy products, such as tofu, may be especially good for your heart. · Choose low-fat or fat-free milk and dairy products. Eat fish  · Eat at least two servings of fish a week. Certain fish, such as salmon and tuna, contain omega-3 fatty acids, which may help reduce your risk of heart attack. Eat foods high in fiber  · Eat a variety of grain products every day. Include whole-grain foods that have lots of fiber and nutrients. Examples of whole-grain foods include oats, whole wheat bread, and brown rice. · Buy whole-grain breads and cereals, instead of white bread or pastries. Limit salt and sodium  · Limit how much salt and sodium you eat to help lower your blood pressure. · Taste food before you salt it. Add only a little salt when you think you need it. With time, your taste buds will adjust to less salt. · Eat fewer snack items, fast foods, and other high-salt, processed foods. Check food labels for the amount of sodium in packaged foods.   · Choose low-sodium versions of canned goods (such as soups, vegetables, and beans). Limit sugar  · Limit drinks and foods with added sugar. These include candy, desserts, and soda pop. Limit alcohol  · Limit alcohol to no more than 2 drinks a day for men and 1 drink a day for women. Too much alcohol can cause health problems. When should you call for help? Watch closely for changes in your health, and be sure to contact your doctor if:    · You would like help planning heart-healthy meals. Where can you learn more? Go to http://mauricio-pablo.info/. Enter V137 in the search box to learn more about \"Heart-Healthy Diet: Care Instructions. \"  Current as of: December 6, 2017  Content Version: 11.8  © 2841-4723 [employer]. Care instructions adapted under license by [employer] (which disclaims liability or warranty for this information). If you have questions about a medical condition or this instruction, always ask your healthcare professional. [place] [address] any warranty or liability for your use of this information. Low Sodium Diet (2,000 Milligram): Care Instructions  Your Care Instructions    Too much sodium causes your body to hold on to extra water. This can raise your blood pressure and force your heart and kidneys to work harder. In very serious cases, this could cause you to be put in the hospital. It might even be life-threatening. By limiting sodium, you will feel better and lower your risk of serious problems. The most common source of sodium is salt. People get most of the salt in their diet from canned, prepared, and packaged foods. Fast food and restaurant meals also are very high in sodium. Your doctor will probably limit your sodium to less than 2,000 milligrams (mg) a day. This limit counts all the sodium in prepared and packaged foods and any salt you add to your food. Follow-up care is a key part of your treatment and safety.  Be sure to make and go to all appointments, and call your doctor if you are having problems. It's also a good idea to know your test results and keep a list of the medicines you take. How can you care for yourself at home? Read food labels  · Read labels on cans and food packages. The labels tell you how much sodium is in each serving. Make sure that you look at the serving size. If you eat more than the serving size, you have eaten more sodium. · Food labels also tell you the Percent Daily Value for sodium. Choose products with low Percent Daily Values for sodium. · Be aware that sodium can come in forms other than salt, including monosodium glutamate (MSG), sodium citrate, and sodium bicarbonate (baking soda). MSG is often added to Asian food. When you eat out, you can sometimes ask for food without MSG or added salt. Buy low-sodium foods  · Buy foods that are labeled \"unsalted\" (no salt added), \"sodium-free\" (less than 5 mg of sodium per serving), or \"low-sodium\" (less than 140 mg of sodium per serving). Foods labeled \"reduced-sodium\" and \"light sodium\" may still have too much sodium. Be sure to read the label to see how much sodium you are getting. · Buy fresh vegetables, or frozen vegetables without added sauces. Buy low-sodium versions of canned vegetables, soups, and other canned goods. Prepare low-sodium meals  · Cut back on the amount of salt you use in cooking. This will help you adjust to the taste. Do not add salt after cooking. One teaspoon of salt has about 2,300 mg of sodium. · Take the salt shaker off the table. · Flavor your food with garlic, lemon juice, onion, vinegar, herbs, and spices. Do not use soy sauce, lite soy sauce, steak sauce, onion salt, garlic salt, celery salt, mustard, or ketchup on your food. · Use low-sodium salad dressings, sauces, and ketchup. Or make your own salad dressings and sauces without adding salt. · Use less salt (or none) when recipes call for it. You can often use half the salt a recipe calls for without losing flavor.  Other foods such as rice, pasta, and grains do not need added salt. · Rinse canned vegetables, and cook them in fresh water. This removes some--but not all--of the salt. · Avoid water that is naturally high in sodium or that has been treated with water softeners, which add sodium. Call your local water company to find out the sodium content of your water supply. If you buy bottled water, read the label and choose a sodium-free brand. Avoid high-sodium foods  · Avoid eating:  ? Smoked, cured, salted, and canned meat, fish, and poultry. ? Ham, pride, hot dogs, and luncheon meats. ? Regular, hard, and processed cheese and regular peanut butter. ? Crackers with salted tops, and other salted snack foods such as pretzels, chips, and salted popcorn. ? Frozen prepared meals, unless labeled low-sodium. ? Canned and dried soups, broths, and bouillon, unless labeled sodium-free or low-sodium. ? Canned vegetables, unless labeled sodium-free or low-sodium. ? [place] fries, pizza, tacos, and other fast foods. ? Pickles, olives, ketchup, and other condiments, especially soy sauce, unless labeled sodium-free or low-sodium. Where can you learn more? Go to http://mauricio-pablo.info/. Enter R581 in the search box to learn more about \"Low Sodium Diet (2,000 Milligram): Care Instructions. \"  Current as of: March 29, 2018  Content Version: 11.8  © 7070-4650 [employer]. Care instructions adapted under license by [employer] (which disclaims liability or warranty for this information). If you have questions about a medical condition or this instruction, always ask your healthcare professional. [place] [address] any warranty or liability for your use of this information.

## 2019-01-22 ENCOUNTER — OFFICE VISIT (OUTPATIENT)
Dept: CARDIOLOGY CLINIC | Age: 64
End: 2019-01-22

## 2019-01-22 VITALS
HEART RATE: 62 BPM | BODY MASS INDEX: 30.21 KG/M2 | WEIGHT: 160 LBS | HEIGHT: 61 IN | OXYGEN SATURATION: 98 % | SYSTOLIC BLOOD PRESSURE: 118 MMHG | DIASTOLIC BLOOD PRESSURE: 70 MMHG

## 2019-01-22 DIAGNOSIS — R77.8 ELEVATED TROPONIN: ICD-10-CM

## 2019-01-22 DIAGNOSIS — I25.10 CORONARY ARTERY DISEASE INVOLVING NATIVE CORONARY ARTERY OF NATIVE HEART WITHOUT ANGINA PECTORIS: ICD-10-CM

## 2019-01-22 DIAGNOSIS — E78.5 HYPERLIPIDEMIA, UNSPECIFIED HYPERLIPIDEMIA TYPE: Primary | ICD-10-CM

## 2019-01-22 DIAGNOSIS — I10 ESSENTIAL HYPERTENSION: ICD-10-CM

## 2019-01-22 DIAGNOSIS — I21.4 NON-ST ELEVATED MYOCARDIAL INFARCTION (NON-STEMI) (HCC): ICD-10-CM

## 2019-01-22 RX ORDER — EZETIMIBE 10 MG/1
10 TABLET ORAL DAILY
Qty: 90 TAB | Refills: 1 | Status: SHIPPED | OUTPATIENT
Start: 2019-01-22

## 2019-01-22 NOTE — PROGRESS NOTES
Review of Systems Constitutional: Negative. Respiratory: Negative. Cardiovascular: Negative. Gastrointestinal: Positive for constipation and diarrhea. Negative for abdominal pain, blood in stool, heartburn, melena, nausea and vomiting. Musculoskeletal: Positive for joint pain. Negative for back pain, falls, myalgias and neck pain. Neurological: Negative for dizziness.

## 2019-01-22 NOTE — PROGRESS NOTES
Aimee Talamantes presents today for Chief Complaint Patient presents with  
[facility] Follow Up  
  follow up Aimee Talamantes preferred language for health care discussion is english/other. Is someone accompanying this pt? No  
 
Is the patient using any DME equipment during OV? No  
 
Depression Screening: No flowsheet data found. Learning Assessment: 
No flowsheet data found. Abuse Screening: No flowsheet data found. Fall Risk No flowsheet data found. Pt currently taking Anticoagulant therapy? Brilinta BID Coordination of Care: 1. Have you been to the ER, urgent care clinic since your last visit? Hospitalized since your last visit? 10/12/18 - 10/13/18 for NSTEMI 2. Have you seen or consulted any other health care providers outside of the [address] [place] [address] since your last visit? Include any pap smears or colon screening.  no

## 2019-01-22 NOTE — PATIENT INSTRUCTIONS
Ezetimibe (By mouth) Ezetimibe (l-OBU-p-mibe) Lowers high cholesterol levels. Brand Name(s): Zetia There may be other brand names for this medicine. When This Medicine Should Not Be Used: This medicine is not right for everyone. Do not use it if you had an allergic reaction to ezetimibe. How to Use This Medicine:  
Tablet · Take your medicine as directed. Your dose may need to be changed several times to find what works best for you. · If you also use cholestyramine, take it at least 2 hours after or 4 hours before you take ezetimibe. · Missed dose: Take a dose as soon as you remember. If it is almost time for your next dose, wait until then and take a regular dose. Do not take extra medicine to make up for a missed dose. · Store the medicine in a closed container at room temperature, away from heat, moisture, and direct light. Drugs and Foods to Avoid: Ask your doctor or pharmacist before using any other medicine, including over-the-counter medicines, vitamins, and herbal products. · Do not use this medicine together with a statin medicine if you are pregnant or breastfeeding, or if you have liver disease. · Some medicines can affect how ezetimibe works. Tell your doctor if you are using any of the following: ¨ Cyclosporine ¨ Cholestyramine ¨ Fenofibrate ¨ Gemfibrozil ¨ A blood thinner, such as warfarin Warnings While Using This Medicine: · Tell your doctor if you are pregnant or breastfeeding, or if you have liver disease, kidney disease, or thyroid problems. · This medicine may cause myopathy or rhabdomyolysis, which are serious muscle problems. · Your doctor will check your progress and the effects of this medicine at regular visits. Keep all appointments. · Keep all medicine out of the reach of children. Never share your medicine with anyone. Possible Side Effects While Using This Medicine:  
Call your doctor right away if you notice any of these side effects: · Allergic reaction: Itching or hives, swelling in your face or hands, swelling or tingling in your mouth or throat, chest tightness, trouble breathing · Muscle pain, tenderness, or weakness · Nausea, vomiting, loss of appetite, stomach pain, yellow skin or eyes If you notice other side effects that you think are caused by this medicine, tell your doctor. Call your doctor for medical advice about side effects. You may report side effects to FDA at 3-748-LXY-9631 © 2017 [facility] Information is for End User's use only and may not be sold, redistributed or otherwise used for commercial purposes. The above information is an  only. It is not intended as medical advice for individual conditions or treatments. Talk to your doctor, nurse or pharmacist before following any medical regimen to see if it is safe and effective for you. Have blood work two months after starting new medication called zetia.

## 2019-01-22 NOTE — PROGRESS NOTES
HPI: This 80-year-old lady with past history of hypertention, hypercholesterolemia, and prediabetes who was admitted to [facility] back on November 11, 2018 with a non-ST elevation MI is seen in cardiovascular follow-up. She has history of presenting to the hospital with left-sided neck pain, vomiting, and epigastric pain and was found to have a troponin that peaked at 14.50 and for which he ultimately had a cardiac catheterization on October 12, 2018 that demonstrated: 1. Distal left main 35%. 2.  40% LAD obstruction. 3.  Small diffusely diseased circumflex system. 4.  75% ramus intermedius. 4.  Culprit ostial RPDA with hazy thrombotic 99% dissected lesion stented to 0% with a 2.75 x 15 mm Xience ELIZABETH. 5.  Total obstruction of the distal AV and small AV circumflex which was collateralized from distal RCA 6. Ejection fraction of 45-50% with inferior posterior regional wall motion abnormality. She comes in today and appears to be doing reasonably well. She is remaining quite active and denies any cardiovascular symptomatology at this time. She did relate that last night after eating some popcorn she had indigestion type discomfort which was somewhat similar to her cardiac pain but resolved with drinking water and wind in about 30-45 minutes and her EKG today shows nothing acute so I doubt very much that this was cardiac. Encounter Diagnoses Name Primary?  Coronary artery disease involving native coronary artery of native heart without angina pectoris  Non-ST elevated myocardial infarction (non-STEMI) ([address] s/p PTCA and stent RPDA on 10/12/2018  Elevated troponin associated with above  Hyperlipidemia, unspecified hyperlipidemia type Yes  Essential hypertension Discussion: This lady appears to be doing about as well as we could expect and really have no recommendations for change.   She is not having any symptoms to suggest recurrent symptomatic obstructive coronary disease or any heart failure symptomatology. Her latest lipid profile which was completed on December 7, 2018 was much better than it had been previously when her total cholesterol was 329 with her total cholesterol being 185, triglycerides 94, HDL 70, LDL 96, and VLDL 19. This is much improved but still not quite optimal and I am going to add Zetia 10 mg to her regimen of Lipitor 80 mg daily and repeat her lipid profile in a couple of months. We did have a long discussion about diet modification and the benefits of a whole foods plant-based diet against eating lots of animal protein which we see in the standard American diet. Her blood pressure seems to be well controlled today and her EKG is stable and since she is otherwise doing well I will simply plan to see her again in several months or sooner if any new cardiovascular symptoms should develop. PCP: Petros Shook NP Past Medical History:  
Diagnosis Date  Headache  High cholesterol  Hypertension Past Surgical History:  
Procedure Laterality Date  HX OTHER SURGICAL    
 right fallopian tube removal  
 HX PARTIAL HYSTERECTOMY No Known Allergies Social History : 
Social History Tobacco Use  Smoking status: Never Smoker  Smokeless tobacco: Never Used Substance Use Topics  Alcohol use: Yes Comment: occasional use Current Outpatient Medications Medication Sig  
 ezetimibe (ZETIA) 10 mg tablet Take 1 Tab by mouth daily.  atorvastatin (LIPITOR) 80 mg tablet Take 1 Tab by mouth nightly.  isosorbide mononitrate ER (IMDUR) 30 mg tablet Take 1 Tab by mouth daily.  metoprolol succinate (TOPROL-XL) 50 mg XL tablet Take 1 Tab by mouth daily.  ticagrelor (BRILINTA) 90 mg tablet Take 1 Tab by mouth every twelve (12) hours every twelve (12) hours.  acetaminophen (TYLENOL) 500 mg tablet Take 1 Tab by mouth every six (6) hours as needed.  aspirin 81 mg chewable tablet Take 1 Tab by mouth daily.  pantoprazole (PROTONIX) 40 mg tablet Take one tablet by mouth before breakfast and dinner daily for two days an then daily before dinner  polyethylene glycol (MIRALAX) 17 gram packet Take 1 Packet by mouth daily as needed (constipation).  OTHER This is to certify that family member for Rabia Booker is currently admitted to [facility] and Jo Moralse has been here at the hospital on 10/12/2018 Please feel free to contact my office if you have any questions or concerns. Thank you.  losartan-hydroCHLOROthiazide (HYZAAR) 50-12.5 mg per tablet Take 1 Tab by mouth daily.  venlafaxine (EFFEXOR) 37.5 mg tablet Take 37.5 mg by mouth daily.  omega 3-DHA-EPA-fish oil 1,000 mg (120 mg-180 mg) capsule Take 1 Cap by mouth daily.  Cetirizine 10 mg cap Take  by mouth. No current facility-administered medications for this visit. Family History: family history is not on file. Review of Systems:  
Constitutional: Negative. Respiratory: Negative. Cardiovascular: Negative. Gastrointestinal: Positive for constipation and diarrhea. Negative for abdominal pain, blood in stool, heartburn, melena, nausea and vomiting. Musculoskeletal: Positive for joint pain. Negative for back pain, falls, myalgias and neck pain. Neurological: Negative for dizziness. Physical Exam:   
The patient is a cooperative, alert, well developed, well nourished 59 y. o.  female who is in no acute distress at the time of the examination. Visit Vitals /70 Pulse 62 Ht 5' 1\" (1.549 m) Wt 72.6 kg (160 lb) SpO2 98% BMI 30.23 kg/m² HEENT: Conjuctiva white, mucosa moist, no pallor or cyanosis. NECK: Supple without masses, tenderness or thyromegaly.  There was no jugular venous distention. Carotid are full bilaterally without bruits. CHEST: Symmetrical with good excursion. LUNGS: Clear to auscultation in all fields. HEART: The apex is not displaced. There were no lifts, thrills or heaves. There is a normal S1 and S2 without appreciable murmurs, rubs, clicks, or gallops auscultated. ABDOMEN: Soft without masses, tenderness or organomegaly. EXTREMITIES: Full peripheral pulses without peripheral edema. INTEGUMENT: Warm and dry NEUROLOGICAL: The patient is oriented x 3 with motor function grossly intact. Review of Data: See PMH and Cardiology and Imaging sections for cardiac testing Lab Results Component Value Date/Time Cholesterol, total 339 (H) 10/12/2018 02:42 AM  
 HDL Cholesterol 78 (H) 10/12/2018 02:42 AM  
 LDL, calculated 236.4 (H) 10/12/2018 02:42 AM  
 Triglyceride 123 10/12/2018 02:42 AM  
 CHOL/HDL Ratio 4.3 10/12/2018 02:42 AM  
 
 
Results for orders placed or performed in visit on 01/22/19 AMB POC EKG ROUTINE W/ 12 LEADS, INTER & REP     Status: None Narrative Normal sinus rhythm, rate 62. Left trigger hypertrophy by limb lead voltage criteria. Left anterior fascicular block. Poor R wave progression across the precordium could suggest past anterior wall damage. Compared to the EKG of November 1, 2018 the T wave inversions in the inferolateral leads noted at that time have resolved. Bibi Pinedo D.O., F.A.C.C. [facility] [address] [place] [address] [address] [address] [facility] [address] [facility] [phone]   [phone] PLEASE NOTE:  This document has been produced using voice recognition software. Unrecognized errors in transcription may be present.

## 2019-03-19 ENCOUNTER — HOSPITAL ENCOUNTER (OUTPATIENT)
Dept: LAB | Age: 64
Discharge: HOME | End: 2019-03-19
Payer: COMMERCIAL

## 2019-03-19 DIAGNOSIS — E78.5 HYPERLIPIDEMIA, UNSPECIFIED HYPERLIPIDEMIA TYPE: ICD-10-CM

## 2019-03-19 LAB
ALBUMIN SERPL-MCNC: 4 G/DL (ref 3.4–5)
ALBUMIN/GLOB SERPL: 1.2 {RATIO} (ref 0.8–1.7)
ALP SERPL-CCNC: 74 U/L (ref 45–117)
ALT SERPL-CCNC: 43 U/L (ref 13–56)
AST SERPL-CCNC: 23 U/L (ref 15–37)
BILIRUB DIRECT SERPL-MCNC: 0.1 MG/DL (ref 0–0.2)
BILIRUB SERPL-MCNC: 0.4 MG/DL (ref 0.2–1)
CHOLEST SERPL-MCNC: 148 MG/DL
GLOBULIN SER CALC-MCNC: 3.4 G/DL (ref 2–4)
HDLC SERPL-MCNC: 68 MG/DL (ref 40–60)
HDLC SERPL: 2.2 {RATIO} (ref 0–5)
LDLC SERPL CALC-MCNC: 63.8 MG/DL (ref 0–100)
LIPID PROFILE,FLP: ABNORMAL
PROT SERPL-MCNC: 7.4 G/DL (ref 6.4–8.2)
TRIGL SERPL-MCNC: 81 MG/DL (ref ?–150)
VLDLC SERPL CALC-MCNC: 16.2 MG/DL

## 2019-03-19 PROCEDURE — 80076 HEPATIC FUNCTION PANEL: CPT

## 2019-03-19 PROCEDURE — 80061 LIPID PANEL: CPT

## 2019-03-19 PROCEDURE — 36415 COLL VENOUS BLD VENIPUNCTURE: CPT

## 2019-03-20 NOTE — PROGRESS NOTES
This lady's total cholesterol is excellent now under 150 at 148 and her HDL is excellent at 68 and her LDL at 63.8 is quite good and the total bad cholesterol adds up to 80 which is excellent control. I believe the patient's on Lipitor 80 mg and Zetia 10 mg and if that is the case I would simply have her stay on the same dose long-term. Please let her know.  ES

## 2019-04-04 ENCOUNTER — TELEPHONE (OUTPATIENT)
Dept: CARDIOLOGY CLINIC | Age: 64
End: 2019-04-04

## 2019-04-04 NOTE — TELEPHONE ENCOUNTER
Patient informed of result confirmed she is still taking Zetia and and Lipitor. Verbalized understanding.

## 2019-04-04 NOTE — TELEPHONE ENCOUNTER
----- Message from Belkis Recinos DO sent at 3/20/2019 10:18 AM EDT -----  This lady's total cholesterol is excellent now under 150 at 148 and her HDL is excellent at 68 and her LDL at 63.8 is quite good and the total bad cholesterol adds up to 80 which is excellent control. I believe the patient's on Lipitor 80 mg and Zetia 10 mg and if that is the case I would simply have her stay on the same dose long-term. Please let her know.  ES

## 2019-07-22 ENCOUNTER — OFFICE VISIT (OUTPATIENT)
Dept: CARDIOLOGY CLINIC | Age: 64
End: 2019-07-22

## 2019-07-22 VITALS
WEIGHT: 160 LBS | DIASTOLIC BLOOD PRESSURE: 78 MMHG | HEART RATE: 65 BPM | SYSTOLIC BLOOD PRESSURE: 126 MMHG | BODY MASS INDEX: 30.21 KG/M2 | OXYGEN SATURATION: 98 % | HEIGHT: 61 IN

## 2019-07-22 DIAGNOSIS — I21.4 NON-ST ELEVATED MYOCARDIAL INFARCTION (NON-STEMI) (HCC): ICD-10-CM

## 2019-07-22 DIAGNOSIS — E78.5 HYPERLIPIDEMIA, UNSPECIFIED HYPERLIPIDEMIA TYPE: ICD-10-CM

## 2019-07-22 DIAGNOSIS — I25.10 CORONARY ARTERY DISEASE INVOLVING NATIVE CORONARY ARTERY OF NATIVE HEART WITHOUT ANGINA PECTORIS: Primary | ICD-10-CM

## 2019-07-22 DIAGNOSIS — I10 ESSENTIAL HYPERTENSION: ICD-10-CM

## 2019-07-22 RX ORDER — TRIAMTERENE AND HYDROCHLOROTHIAZIDE 37.5; 25 MG/1; MG/1
1 CAPSULE ORAL
COMMUNITY
Start: 2019-05-06

## 2019-07-22 NOTE — PROGRESS NOTES
HPI: This 44-year-old lady with past history of hypertention, hypercholesterolemia, and prediabetes who was admitted to [facility] back on November 11, 2018 with a non-ST elevation MI is seen in cardiovascular follow-up. She has history of presenting to the hospital with left-sided neck pain, vomiting, and epigastric pain and was found to have a troponin that peaked at 14.50 and for which he ultimately had a cardiac catheterization on October 12, 2018 that demonstrated:      1. Distal left main 35%. 2.  40% LAD obstruction. 3.  Small diffusely diseased circumflex system. 4.  75% ramus intermedius. 4.  Culprit ostial RPDA with hazy thrombotic 99% dissected lesion stented to 0% with a 2.75 x 15 mm Xience ELIZABETH. 5.  Total obstruction of the distal AV and small AV circumflex which was collateralized from distal RCA  6. Ejection fraction of 45-50% with inferior posterior regional wall motion abnormality. She is done well on medical therapy and comes in today without any cardiovascular complaints whatsoever. She does sleep with her head up at night but is not really complaining of any orthopnea. Encounter Diagnoses   Name Primary?  Coronary artery disease involving native coronary artery of native heart without angina pectoris Yes    Non-ST elevated myocardial infarction (non-STEMI) ([facility]) s/p PTCA and stent RPDA on 10/12/2018     Hyperlipidemia, unspecified hyperlipidemia type     Essential hypertension        Discussion: This lady appears to be doing about as well as we could expect and really of no recommendations for change at this time. She is not having any symptoms to suggest either development of recurrent symptomatic obstructive coronary disease or heart failure.     Her latest lipid profile which was completed on March 19, 2019 show total cholesterol 148 with triglycerides of 81, HDL of 68, LDL of 63.8, and VLDL of 16.2 which I think is good control on Lipitor 80 mg daily with Zetia 10 mg daily. Her blood pressure is well controlled today and her EKG appears to be stable and since she is otherwise doing very well from a cardiac vantage I am simply can plan to see her again in 6 to 8 months or sooner if any new cardiovascular symptoms surface     PCP: Pete Tam NP      Past Medical History:   Diagnosis Date    Headache     High cholesterol     Hypertension        Past Surgical History:   Procedure Laterality Date    HX OTHER SURGICAL      right fallopian tube removal    HX PARTIAL HYSTERECTOMY       Current Outpatient Medications   Medication Sig    triamterene-hydroCHLOROthiazide (DYAZIDE) 37.5-25 mg per capsule Take 1 Cap by mouth.  ezetimibe (ZETIA) 10 mg tablet Take 1 Tab by mouth daily.  atorvastatin (LIPITOR) 80 mg tablet Take 1 Tab by mouth nightly.  isosorbide mononitrate ER (IMDUR) 30 mg tablet Take 1 Tab by mouth daily.  metoprolol succinate (TOPROL-XL) 50 mg XL tablet Take 1 Tab by mouth daily.  ticagrelor (BRILINTA) 90 mg tablet Take 1 Tab by mouth every twelve (12) hours every twelve (12) hours.  acetaminophen (TYLENOL) 500 mg tablet Take 1 Tab by mouth every six (6) hours as needed.  aspirin 81 mg chewable tablet Take 1 Tab by mouth daily.  pantoprazole (PROTONIX) 40 mg tablet Take one tablet by mouth before breakfast and dinner daily for two days an then daily before dinner    polyethylene glycol (MIRALAX) 17 gram packet Take 1 Packet by mouth daily as needed (constipation).  OTHER This is to certify that family member for [place] is currently admitted to [facility]"" and Charles Paz has been here at the hospital on 10/12/2018    Please feel free to contact my office if you have any questions or concerns. Thank you.  venlafaxine (EFFEXOR) 37.5 mg tablet Take 37.5 mg by mouth daily.  omega 3-DHA-EPA-fish oil 1,000 mg (120 mg-180 mg) capsule Take 1 Cap by mouth daily.     Cetirizine 10 mg cap Take  by mouth.     No current facility-administered medications for this visit. No Known Allergies    Social History :  Social History     Tobacco Use    Smoking status: Never Smoker    Smokeless tobacco: Never Used   Substance Use Topics    Alcohol use: Yes     Comment: occasional use          Family History: family history is not on file. Review of Systems:   Constitutional: Negative. Respiratory: Negative. Cardiovascular: Negative. Gastrointestinal: Negative. Musculoskeletal: Negative. Neurological: Negative. Physical Exam:    The patient is a cooperative, alert, well developed, well nourished 62 y. o.  female who is in no acute distress at the time of the examination. Visit Vitals  /78   Pulse 65   Ht 5' 1\" (1.549 m)   Wt 72.6 kg (160 lb)   SpO2 98%   BMI 30.23 kg/m²       HEENT: Conjuctiva white, mucosa moist, no pallor or cyanosis. NECK: Supple without masses, tenderness or thyromegaly. There was no jugular venous distention. Carotid are full bilaterally without bruits. CHEST: Symmetrical with good excursion. LUNGS: Clear to auscultation in all fields. HEART: The apex is not displaced. There were no lifts, thrills or heaves. There is a normal S1 and S2 without appreciable murmurs, rubs, clicks, or gallops auscultated. ABDOMEN: Soft without masses, tenderness or organomegaly. EXTREMITIES: Full peripheral pulses without peripheral edema. INTEGUMENT: Warm and dry   NEUROLOGICAL: The patient is oriented x 3 with motor function grossly intact.     Review of Data: See PMH and Cardiology and Imaging sections for cardiac testing  Lab Results   Component Value Date/Time    Cholesterol, total 148 03/19/2019 01:21 PM    HDL Cholesterol 68 (H) 03/19/2019 01:21 PM    LDL, calculated 63.8 03/19/2019 01:21 PM    Triglyceride 81 03/19/2019 01:21 PM    CHOL/HDL Ratio 2.2 03/19/2019 01:21 PM       Results for orders placed or performed in visit on 07/22/19   [facility] POC EKG ROUTINE W/ 12 LEADS, INTER & REP     Status: None    Narrative    Normal sinus rhythm rate 65. Left anterior fascicular block. Borderline left ventricular hypertrophy by limb lead voltage criteria. Poor R wave progression anteriorly which could be a normal variant. Compared to the EKG of January 22, 2019 there was little interval change         Lydia Malone D.O., FRUTHYCYenniC. [facility]  [facility] [place]  [address]      PLEASE NOTE:  This document has been produced using voice recognition software. Unrecognized errors in transcription may be present.

## 2020-01-22 ENCOUNTER — OFFICE VISIT (OUTPATIENT)
Dept: CARDIOLOGY CLINIC | Age: 65
End: 2020-01-22

## 2020-01-22 VITALS
HEIGHT: 61 IN | SYSTOLIC BLOOD PRESSURE: 140 MMHG | WEIGHT: 153 LBS | DIASTOLIC BLOOD PRESSURE: 84 MMHG | OXYGEN SATURATION: 98 % | BODY MASS INDEX: 28.89 KG/M2 | HEART RATE: 63 BPM

## 2020-01-22 DIAGNOSIS — E78.5 HYPERLIPIDEMIA, UNSPECIFIED HYPERLIPIDEMIA TYPE: ICD-10-CM

## 2020-01-22 DIAGNOSIS — I10 ESSENTIAL HYPERTENSION: ICD-10-CM

## 2020-01-22 DIAGNOSIS — I25.10 CORONARY ARTERY DISEASE INVOLVING NATIVE CORONARY ARTERY OF NATIVE HEART WITHOUT ANGINA PECTORIS: Primary | ICD-10-CM

## 2020-01-22 NOTE — PROGRESS NOTES
HPI: This 60-year-old lady with past history of hypertention, hypercholesterolemia, and prediabetes who was admitted to [facility] back on November 11, 2018 with a non-ST elevation MI is seen in cardiovascular follow-up. Rowena Hodges has history of presenting to the hospital with left-sided neck pain, vomiting, and epigastric pain and was found to have a troponin that peaked at 14.50 and for which he ultimately had a cardiac catheterization on October 12, 2018 that demonstrated:      1.  Distal left main 35%. 2.  40% LAD obstruction. 3.  Small diffusely diseased circumflex system. 4.  75% ramus intermedius. 4.  Culprit ostial RPDA with hazy thrombotic 99% dissected lesion stented to 0% with a 2.75 x 15 mm Xience ELIZABETH. 5.  Total obstruction of the distal AV and small AV circumflex which was collateralized from distal RCA  6.  Ejection fraction of 45-50% with inferior posterior regional wall motion abnormality.     She is done well on medical therapy and comes in today without any cardiovascular complaints whatsoever. She does sleep with her head up at night but is not really complaining of any orthopnea. Encounter Diagnoses   Name Primary?  Coronary artery disease involving native coronary artery of native heart without angina pectoris Yes    Hyperlipidemia, unspecified hyperlipidemia type     Essential hypertension        Discussion: This lady is doing extremely well from a cardiovascular vantage without any recurrent anginal type symptoms and since has been over a year since her stenting procedure I think she can come off of Brilinta and just stay on aspirin 81 mg daily for antiplatelet therapy. She relates lipid profile which was completed 19 showed total cholesterol of 168 with triglycerides of 78, HDL 72, LDL of 81, and VLDL of 16 which I think is somewhat suboptimal control on a combination of Lipitor 80 mg daily and Zetia 10 mg daily.   I have encouraged her to eat somewhat more of a plant-based diet increasing her fruits, vegetables, nuts, beans, and whole grains and I would simply have her get her cholesterol repeated through her family physician next several months    Her blood pressure is borderline elevated today but I am not going to make any changes in her medications at this time and will simply continue her on her present regimen having her follow-up with her family physician with regard to her blood pressure and other medical problems and I will simply plan to see her again in several months. PCP: Yvette Brewster NP      Past Medical History:   Diagnosis Date    Headache     High cholesterol     Hypertension        Past Surgical History:   Procedure Laterality Date    HX OTHER SURGICAL      right fallopian tube removal    HX PARTIAL HYSTERECTOMY       Current Outpatient Medications   Medication Sig    ticagrelor (BRILINTA) 90 mg tablet Take 1 Tab by mouth every twelve (12) hours every twelve (12) hours.  triamterene-hydroCHLOROthiazide (DYAZIDE) 37.5-25 mg per capsule Take 1 Cap by mouth.  ezetimibe (ZETIA) 10 mg tablet Take 1 Tab by mouth daily.  atorvastatin (LIPITOR) 80 mg tablet Take 1 Tab by mouth nightly.  isosorbide mononitrate ER (IMDUR) 30 mg tablet Take 1 Tab by mouth daily.  metoprolol succinate (TOPROL-XL) 50 mg XL tablet Take 1 Tab by mouth daily.  acetaminophen (TYLENOL) 500 mg tablet Take 1 Tab by mouth every six (6) hours as needed.  aspirin 81 mg chewable tablet Take 1 Tab by mouth daily.  pantoprazole (PROTONIX) 40 mg tablet Take one tablet by mouth before breakfast and dinner daily for two days an then daily before dinner    polyethylene glycol (MIRALAX) 17 gram packet Take 1 Packet by mouth daily as needed (constipation).     OTHER This is to certify that family member for Jax San is currently admitted to [facility] and Shraddha Elderapp has been here at the hospital on 10/12/2018    Please feel free to contact my office if you have any questions or concerns. Thank you.  venlafaxine (EFFEXOR) 37.5 mg tablet Take 37.5 mg by mouth daily.  omega 3-DHA-EPA-fish oil 1,000 mg (120 mg-180 mg) capsule Take 1 Cap by mouth daily.  Cetirizine 10 mg cap Take  by mouth. No current facility-administered medications for this visit. No Known Allergies    Social History :  Social History     Tobacco Use    Smoking status: Never Smoker    Smokeless tobacco: Never Used   Substance Use Topics    Alcohol use: Yes     Comment: occasional use          Family History: family history is not on file. Review of Systems:   Constitutional: Positive for weight loss. Negative for chills, diaphoresis, fever and malaise/fatigue. Respiratory: Negative. Cardiovascular: Positive for orthopnea. Negative for chest pain, palpitations and leg swelling. Gastrointestinal: Positive for constipation. Negative for abdominal pain, blood in stool, diarrhea, heartburn, melena, nausea and vomiting. Musculoskeletal: Negative. Neurological: Negative. Physical Exam:    The patient is a cooperative, alert, well developed, well nourished 62 y. o.  female who is in no acute distress at the time of the examination. Visit Vitals  /84 (BP 1 Location: Left arm, BP Patient Position: Sitting)   Pulse 63   Ht 5' 1\" (1.549 m)   Wt 69.4 kg (153 lb)   SpO2 98%   BMI 28.91 kg/m²       HEENT: Conjuctiva white, mucosa moist, no pallor or cyanosis. NECK: Supple without masses, tenderness or thyromegaly. There was no jugular venous distention. Carotid are full bilaterally without bruits. CHEST: Symmetrical with good excursion. LUNGS: Clear to auscultation in all fields. HEART: The apex is not displaced. There were no lifts, thrills or heaves. There is a normal S1 and S2 without appreciable murmurs, rubs, clicks, or gallops auscultated.   ABDOMEN: Soft without masses, tenderness or organomegaly. EXTREMITIES: Full peripheral pulses without peripheral edema. INTEGUMENT: Warm and dry   NEUROLOGICAL: The patient is oriented x 3 with motor function grossly intact. Review of Data: See PMH and Cardiology and Imaging sections for cardiac testing  Lab Results   Component Value Date/Time    Cholesterol, total 148 03/19/2019 01:21 PM    HDL Cholesterol 68 (H) 03/19/2019 01:21 PM    LDL, calculated 63.8 03/19/2019 01:21 PM    Triglyceride 81 03/19/2019 01:21 PM    CHOL/HDL Ratio 2.2 03/19/2019 01:21 PM       Results for orders placed or performed in visit on 01/22/20   AMB POC EKG ROUTINE W/ 12 LEADS, INTER & REP     Status: None    Narrative    Normal sinus rhythm, rate 63. Left anterior fascicular block. This tracing is otherwise within normal limits and similar to the EKG of July 22, 2019. Adilene Jo D.O., F.A.C.C. Cardiovascular Specialists  [facility]  [address] [place] [address]. [address] [facility]  [phone]      PLEASE NOTE:  This document has been produced using voice recognition software. Unrecognized errors in transcription may be present.

## 2020-11-23 ENCOUNTER — OFFICE VISIT (OUTPATIENT)
Dept: CARDIOLOGY CLINIC | Age: 65
End: 2020-11-23
Payer: COMMERCIAL

## 2020-11-23 VITALS
HEIGHT: 61 IN | SYSTOLIC BLOOD PRESSURE: 132 MMHG | OXYGEN SATURATION: 98 % | HEART RATE: 58 BPM | BODY MASS INDEX: 30.02 KG/M2 | WEIGHT: 159 LBS | DIASTOLIC BLOOD PRESSURE: 78 MMHG

## 2020-11-23 DIAGNOSIS — E78.5 HYPERLIPIDEMIA, UNSPECIFIED HYPERLIPIDEMIA TYPE: ICD-10-CM

## 2020-11-23 DIAGNOSIS — I25.10 CORONARY ARTERY DISEASE INVOLVING NATIVE CORONARY ARTERY OF NATIVE HEART WITHOUT ANGINA PECTORIS: Primary | ICD-10-CM

## 2020-11-23 DIAGNOSIS — I21.4 NON-ST ELEVATED MYOCARDIAL INFARCTION (NON-STEMI) (HCC): ICD-10-CM

## 2020-11-23 DIAGNOSIS — I10 ESSENTIAL HYPERTENSION: ICD-10-CM

## 2020-11-23 PROCEDURE — 99214 OFFICE O/P EST MOD 30 MIN: CPT | Performed by: INTERNAL MEDICINE

## 2020-11-23 PROCEDURE — 93000 ELECTROCARDIOGRAM COMPLETE: CPT | Performed by: INTERNAL MEDICINE

## 2020-11-23 NOTE — PROGRESS NOTES
HPI: This 61-year-old lady with past history of hypertention, hypercholesterolemia, and prediabetes and hospital admission for non-ST elevation myocardial infarction in November 2018 is seen in routine cardiovascular follow-up. Ruth Wise has history of presenting to the hospital with left-sided neck pain, vomiting, and epigastric pain and was found to have a troponin that peaked at 14.50 and for which he ultimately had a cardiac catheterization on October 12, 2018 by Dr. Dalton Olsen that demonstrated:      1.  Distal left main 35%. 2.  40% LAD obstruction. 3.  Small diffusely diseased circumflex system. 4.  75% ramus intermedius. 4.  Culprit ostial RPDA with hazy thrombotic 99% dissected lesion stented to 0% with a 2.75 x 15 mm Xience ELIZABETH. 5.  Total obstruction of the distal AV and small AV circumflex which was collateralized from distal RCA  6.  Ejection fraction of 45-50% with inferior posterior regional wall motion abnormality.     She is done well on medical therapy and comes in today without any cardiovascular complaints whatsoever. She does sleep with her head up at night but is not really complaining of any orthopnea. Encounter Diagnoses   Name Primary?  Coronary artery disease involving native coronary artery of native heart without angina pectoris Yes    Essential hypertension     Non-ST elevated myocardial infarction (non-STEMI) ([facility]) s/p PTCA and stent RPDA on 10/12/2018        Discussion: This lady is doing extremely well from a cardiovascular vantage recommendations for change. She is not having any symptoms to suggest the development of either recurrent symptomatic obstructive coronary artery disease or heart failure. Her latest lipid profile which I have a copy of was completed March 19, 2019 showed total cholesterol of 148 with triglycerides of 81, HDL 68, LDL of 63.8, and VLDL of 16.2 which I think is somewhat good control on a combination of Lipitor 80 mg daily and Zetia 10 mg daily.   I do not have a more recent lipid profile and she is going to be getting it repeated in February 2021 through her primary care physician's office. She is no longer taking Zetia however and only atorvastatin 80 mg daily so we will have to see how well it is controlled off of Zetia. Her blood pressure is well controlled today and her EKG is stable so I am simply going to have her seen in several months by my associate Dr. Robert Renae who actually did her stenting procedure since I will be retiring at the end of this year. PCP: Adele Nascimento NP      Past Medical History:   Diagnosis Date    Headache     High cholesterol     Hypertension        Past Surgical History:   Procedure Laterality Date    HX OTHER SURGICAL      right fallopian tube removal    HX PARTIAL HYSTERECTOMY       Current Outpatient Medications   Medication Sig    triamterene-hydroCHLOROthiazide (DYAZIDE) 37.5-25 mg per capsule Take 1 Cap by mouth.  atorvastatin (LIPITOR) 80 mg tablet Take 1 Tab by mouth nightly.  isosorbide mononitrate ER (IMDUR) 30 mg tablet Take 1 Tab by mouth daily.  metoprolol succinate (TOPROL-XL) 50 mg XL tablet Take 1 Tab by mouth daily.  acetaminophen (TYLENOL) 500 mg tablet Take 1 Tab by mouth every six (6) hours as needed.  aspirin 81 mg chewable tablet Take 1 Tab by mouth daily.  pantoprazole (PROTONIX) 40 mg tablet Take one tablet by mouth before breakfast and dinner daily for two days an then daily before dinner    polyethylene glycol (MIRALAX) 17 gram packet Take 1 Packet by mouth daily as needed (constipation).  venlafaxine (EFFEXOR) 37.5 mg tablet Take 37.5 mg by mouth daily.  omega 3-DHA-EPA-fish oil 1,000 mg (120 mg-180 mg) capsule Take 1 Cap by mouth daily.  Cetirizine 10 mg cap Take  by mouth.  ticagrelor (BRILINTA) 90 mg tablet Take 1 Tab by mouth every twelve (12) hours every twelve (12) hours.  ezetimibe (ZETIA) 10 mg tablet Take 1 Tab by mouth daily.      No current facility-administered medications for this visit. No Known Allergies    Social History :  Social History     Tobacco Use    Smoking status: Never Smoker    Smokeless tobacco: Never Used   Substance Use Topics    Alcohol use: Yes     Comment: occasional use          Family History: family history is not on file. Review of Systems:   Constitutional: Positive for weight loss. Negative for chills, diaphoresis, fever and malaise/fatigue. Respiratory: Negative. Cardiovascular: Positive for orthopnea. Negative for chest pain, palpitations and leg swelling. Gastrointestinal: Positive for constipation. Negative for abdominal pain, blood in stool, diarrhea, heartburn, melena, nausea and vomiting. Musculoskeletal: Negative. Neurological: Negative. Physical Exam:    The patient is a cooperative, alert, well developed, well nourished 60 y. o.  female who is in no acute distress at the time of the examination. Visit Vitals  /78   Pulse (!) 58   Ht 5' 1\" (1.549 m)   Wt 72.1 kg (159 lb)   SpO2 98%   BMI 30.04 kg/m²       HEENT: Conjuctiva white, mucosa moist, no pallor or cyanosis. NECK: Supple without masses, tenderness or thyromegaly. There was no jugular venous distention. Carotid are full bilaterally without bruits. CHEST: Symmetrical with good excursion. LUNGS: Clear to auscultation in all fields. HEART: The apex is not displaced. There were no lifts, thrills or heaves. There is a normal S1 and S2 without appreciable murmurs, rubs, clicks, or gallops auscultated. ABDOMEN: Soft without masses, tenderness or organomegaly. EXTREMITIES: Full peripheral pulses without peripheral edema. INTEGUMENT: Warm and dry   NEUROLOGICAL: The patient is oriented x 3 with motor function grossly intact.     Review of Data: See PMH and Cardiology and Imaging sections for cardiac testing  Lab Results   Component Value Date/Time    Cholesterol, total 148 03/19/2019 01:21 PM    HDL Cholesterol 68 (H) 03/19/2019 01:21 PM    LDL, calculated 63.8 03/19/2019 01:21 PM    Triglyceride 81 03/19/2019 01:21 PM    CHOL/HDL Ratio 2.2 03/19/2019 01:21 PM       Results for orders placed or performed in visit on 11/23/20   AMB POC EKG ROUTINE W/ 12 LEADS, INTER & REP     Status: None    Narrative    Bradycardia with rate 58. Left anterior fascicular block. LVH by limb lead voltage criteria. Compared to the EKG of November 22, 2020 there was little interval change. Tarik Cameron D.O., F.A.C.C. Cardiovascular Specialists  [facility] [place]  [address] [place] [address]. [address] [facility] [place]    [phone]      PLEASE NOTE:  This document has been produced using voice recognition software. Unrecognized errors in transcription may be present.

## 2021-06-23 ENCOUNTER — OFFICE VISIT (OUTPATIENT)
Dept: ORTHOPEDIC SURGERY | Age: 66
End: 2021-06-23
Payer: COMMERCIAL

## 2021-06-23 VITALS — OXYGEN SATURATION: 97 % | WEIGHT: 163 LBS | BODY MASS INDEX: 30.8 KG/M2 | TEMPERATURE: 97.1 F | HEART RATE: 69 BPM

## 2021-06-23 DIAGNOSIS — R20.0 NUMBNESS AND TINGLING OF RIGHT ARM: Primary | ICD-10-CM

## 2021-06-23 DIAGNOSIS — M47.22 OSTEOARTHRITIS OF SPINE WITH RADICULOPATHY, CERVICAL REGION: ICD-10-CM

## 2021-06-23 DIAGNOSIS — R20.2 NUMBNESS AND TINGLING OF RIGHT ARM: Primary | ICD-10-CM

## 2021-06-23 PROCEDURE — 3017F COLORECTAL CA SCREEN DOC REV: CPT | Performed by: ORTHOPAEDIC SURGERY

## 2021-06-23 PROCEDURE — 72040 X-RAY EXAM NECK SPINE 2-3 VW: CPT | Performed by: ORTHOPAEDIC SURGERY

## 2021-06-23 PROCEDURE — G8400 PT W/DXA NO RESULTS DOC: HCPCS | Performed by: ORTHOPAEDIC SURGERY

## 2021-06-23 PROCEDURE — G8427 DOCREV CUR MEDS BY ELIG CLIN: HCPCS | Performed by: ORTHOPAEDIC SURGERY

## 2021-06-23 PROCEDURE — G8417 CALC BMI ABV UP PARAM F/U: HCPCS | Performed by: ORTHOPAEDIC SURGERY

## 2021-06-23 PROCEDURE — G8510 SCR DEP NEG, NO PLAN REQD: HCPCS | Performed by: ORTHOPAEDIC SURGERY

## 2021-06-23 PROCEDURE — 99203 OFFICE O/P NEW LOW 30 MIN: CPT | Performed by: ORTHOPAEDIC SURGERY

## 2021-06-23 PROCEDURE — G8536 NO DOC ELDER MAL SCRN: HCPCS | Performed by: ORTHOPAEDIC SURGERY

## 2021-06-23 PROCEDURE — 1101F PT FALLS ASSESS-DOCD LE1/YR: CPT | Performed by: ORTHOPAEDIC SURGERY

## 2021-06-23 PROCEDURE — 1090F PRES/ABSN URINE INCON ASSESS: CPT | Performed by: ORTHOPAEDIC SURGERY

## 2021-06-23 PROCEDURE — 73030 X-RAY EXAM OF SHOULDER: CPT | Performed by: ORTHOPAEDIC SURGERY

## 2021-06-23 NOTE — PROGRESS NOTES
Patient: Kenia Kidd                MRN: [id number]       SSN: [id number]  YOB: 1955        AGE: 73 y.o. SEX: female  Body mass index is 30.8 kg/m². PCP: Werner Kumari NP  06/23/21    CHIEF COMPLAINT: Right arm pain/numbness and tingling    HPI: Kenia Kidd is a 73 y.o. female patient who comes to the office today for right arm numbness and tingling and pain. She says this is been going on for about 2 to 3 weeks. She denies any specific injury recently. She does not report much in the way of neck pain but does have some shoulder pain as well as pain and numbness and tingling that radiates to all other fingers but primarily her thumb. Past Medical History:   Diagnosis Date    Headache     High cholesterol     Hypertension        History reviewed. No pertinent family history. Current Outpatient Medications   Medication Sig Dispense Refill    ticagrelor (BRILINTA) 90 mg tablet Take 1 Tab by mouth every twelve (12) hours every twelve (12) hours. 270 Tab 3    triamterene-hydroCHLOROthiazide (DYAZIDE) 37.5-25 mg per capsule Take 1 Cap by mouth.  ezetimibe (ZETIA) 10 mg tablet Take 1 Tab by mouth daily. 90 Tab 1    atorvastatin (LIPITOR) 80 mg tablet Take 1 Tab by mouth nightly. 90 Tab 3    isosorbide mononitrate ER (IMDUR) 30 mg tablet Take 1 Tab by mouth daily. 90 Tab 3    metoprolol succinate (TOPROL-XL) 50 mg XL tablet Take 1 Tab by mouth daily. 90 Tab 3    acetaminophen (TYLENOL) 500 mg tablet Take 1 Tab by mouth every six (6) hours as needed. 30 Tab 0    aspirin 81 mg chewable tablet Take 1 Tab by mouth daily. 30 Tab 0    pantoprazole (PROTONIX) 40 mg tablet Take one tablet by mouth before breakfast and dinner daily for two days an then daily before dinner 32 Tab 0    polyethylene glycol (MIRALAX) 17 gram packet Take 1 Packet by mouth daily as needed (constipation).  30 Packet 0    venlafaxine (EFFEXOR) 37.5 mg tablet Take 37.5 mg by mouth daily.  omega 3-DHA-EPA-fish oil 1,000 mg (120 mg-180 mg) capsule Take 1 Cap by mouth daily.  Cetirizine 10 mg cap Take  by mouth. No Known Allergies    Past Surgical History:   Procedure Laterality Date    HX OTHER SURGICAL      right fallopian tube removal    HX PARTIAL HYSTERECTOMY         Social History     Socioeconomic History    Marital status:      Spouse name: Not on file    Number of children: Not on file    Years of education: Not on file    Highest education level: Not on file   Occupational History    Not on file   Tobacco Use    Smoking status: Never Smoker    Smokeless tobacco: Never Used   Vaping Use    Vaping Use: Never used   Substance and Sexual Activity    Alcohol use: Yes     Comment: occasional use    Drug use: No    Sexual activity: Not on file   Other Topics Concern    Not on file   Social History Narrative    Not on file     Social Determinants of Health     Financial Resource Strain:     Difficulty of Paying Living Expenses:    Food Insecurity:     Worried About Running Out of Food in the Last Year:     Ran Out of Food in the Last Year:    Transportation Needs:     Lack of Transportation (Medical):      Lack of Transportation (Non-Medical):    Physical Activity:     Days of Exercise per Week:     Minutes of Exercise per Session:    Stress:     Feeling of Stress :    Social Connections:     Frequency of Communication with Friends and Family:     Frequency of Social Gatherings with Friends and Family:     Attends Hoahaoism Services:     Active Member of Clubs or Organizations:     Attends Club or Organization Meetings:     Marital Status:    Intimate Partner Violence:     Fear of Current or Ex-Partner:     Emotionally Abused:     Physically Abused:     Sexually Abused:        REVIEW OF SYSTEMS:      CON: negative for recent weight loss/gain, fever, or chills  EYE: negative for double or blurry vision  ENT: negative for hoarseness  RS:   negative for cough, URI, SOB  CV:  negative for chest pain, palpitations  GI:    negative for blood in stool, nausea/vomiting  :  negative for blood in urine  MS: As per HPI  Other systems reviewed and noted below. PHYSICAL EXAMINATION:  Visit Vitals  Pulse 69   Temp 97.1 °F (36.2 °C) (Temporal)   Wt 163 lb (73.9 kg)   SpO2 97%   BMI 30.80 kg/m²     Body mass index is 30.8 kg/m². GENERAL: Alert and oriented x3, in no acute distress, well-developed, well-nourished. HEENT: Normocephalic, atraumatic. RESP: Non labored breathing with equal chest rise on inspiration. CV: Well perfused extremities. No cyanosis or clubbing noted. ABDOMEN: Soft, non-tender, non-distended. Cervical Spine Examination  Neck ROM   Flexion    Full   Extension   Full   Lateral Rotation  Full  Spurling's    Neg  Focal Neuro Deficits   None  Sensation C5-T1   Full  Strength C5-T1   5/5  Tenderness C Spine   None  Stepoff C Spine   None  Paraspinal Tenderness  None  Other Findings   None    Shoulder Examination     R   L  ROM   FF  Full   Full  ER  Full   Full   IR  Full   Full  Rotator Cuff Pain   Supra  -   -   Infra  -   -   Subscap -   -  Crepitus  -   -  Effusion  -   -  Warmth  -   -   Erythema  -   -  Instability  -   -  AC Joint TTP  -   -  Clavicle   Deformity -   -   TTP  -   -  Proximal Humerus   Deformity -   -   TTP  -   -  Deltoid Strength 5   5  Biceps Strength 5   5  Biceps Deformity -   -  Biceps Groove Pain -   -  Impingement Sign -   -       IMAGING:  X-rays of the cervical spine 2 views were taken the office today which show arthritic change with loss of normal cervical lordosis and osteophyte formation C3-C5    X-rays of the right shoulder 4 views were taken the office today which are normal    ASSESSMENT & PLAN  Diagnosis: Right-sided cervical radiculopathy    I believe that she is having pain coming from her cervical spine and radicular symptoms down her right arm.   I recommended physical therapy for this but she would like to hold off on that.   She can take anti-inflammatories as needed and if she has any further problems follow-up with spine team.      Electronically signed by: Ashley Zhu MD

## 2021-07-20 ENCOUNTER — OFFICE VISIT (OUTPATIENT)
Dept: CARDIOLOGY CLINIC | Age: 66
End: 2021-07-20
Payer: MEDICARE

## 2021-07-20 VITALS
DIASTOLIC BLOOD PRESSURE: 80 MMHG | HEIGHT: 61 IN | HEART RATE: 67 BPM | BODY MASS INDEX: 31.15 KG/M2 | WEIGHT: 165 LBS | OXYGEN SATURATION: 100 % | SYSTOLIC BLOOD PRESSURE: 118 MMHG

## 2021-07-20 DIAGNOSIS — I25.10 CORONARY ARTERY DISEASE INVOLVING NATIVE CORONARY ARTERY OF NATIVE HEART WITHOUT ANGINA PECTORIS: Primary | ICD-10-CM

## 2021-07-20 PROCEDURE — 1090F PRES/ABSN URINE INCON ASSESS: CPT | Performed by: INTERNAL MEDICINE

## 2021-07-20 PROCEDURE — G8417 CALC BMI ABV UP PARAM F/U: HCPCS | Performed by: INTERNAL MEDICINE

## 2021-07-20 PROCEDURE — 99214 OFFICE O/P EST MOD 30 MIN: CPT | Performed by: INTERNAL MEDICINE

## 2021-07-20 PROCEDURE — G8400 PT W/DXA NO RESULTS DOC: HCPCS | Performed by: INTERNAL MEDICINE

## 2021-07-20 PROCEDURE — 3017F COLORECTAL CA SCREEN DOC REV: CPT | Performed by: INTERNAL MEDICINE

## 2021-07-20 PROCEDURE — G8510 SCR DEP NEG, NO PLAN REQD: HCPCS | Performed by: INTERNAL MEDICINE

## 2021-07-20 PROCEDURE — G8536 NO DOC ELDER MAL SCRN: HCPCS | Performed by: INTERNAL MEDICINE

## 2021-07-20 PROCEDURE — G8427 DOCREV CUR MEDS BY ELIG CLIN: HCPCS | Performed by: INTERNAL MEDICINE

## 2021-07-20 PROCEDURE — 1101F PT FALLS ASSESS-DOCD LE1/YR: CPT | Performed by: INTERNAL MEDICINE

## 2021-07-20 PROCEDURE — 93000 ELECTROCARDIOGRAM COMPLETE: CPT | Performed by: INTERNAL MEDICINE

## 2021-07-20 NOTE — PROGRESS NOTES
HISTORY OF PRESENT ILLNESS  Glo Cheatham is a 73 y.o. female. ASSESSMENT and PLAN    Ms. Glo Cheatham has history of CAD. In November 2018, she presented to the hospital with chest pains and NSTEMI. She had left-sided neck pain, vomiting as well as epigastric pain. Coronary angiography revealed distal left main 35%, LAD 40%, small diffusely diseased circumflex, 75% ramus intermedius with culprit lesion being ostial RPDA hazy, thrombotic 99% dissected lesion. She had total obstruction of the distal AV circumflex which was collateralized from the distal RCA. The culprit ostial RPDA lesion was stented using 2.75 mm ELIZABETH. Ejection fraction at that time revealed 45-50% with inferior, posterior hypokinesis. From cardiac standpoint, she appears to be doing well. She has not had any recurrent chest discomfort or her previous anginal symptoms. Her blood pressure is well controlled. Her rhythm remains stable sinus. There is no evidence of decompensated CHF noted. Her weight is 165 pounds. In 2018, post hospitalization, she weighed 148 pounds. Her target LDL is less than 70; she remains on Zetia 10, Lipitor 80. She denies active tobacco use. She continues on baby aspirin and Brilinta. I would continue her current cardiac medication regimen. I have advised her to try to lose about 10-15 pounds. I will see her back in 6 months. Thank you. Encounter Diagnoses   Name Primary?  Coronary artery disease involving native coronary artery of native heart without angina pectoris Yes     current treatment plan is effective, no change in therapy  lab results and schedule of future lab studies reviewed with patient  reviewed diet, exercise and weight control  cardiovascular risk and specific lipid/LDL goals reviewed  use of aspirin to prevent MI and TIA's discussed      HPI   Today, Ms. Still has no complaints of chest pains, neck pain, or epigastric this pain.   She remains active physically. She denies any changes in her activity levels. She denies any orthopnea or PND. She denies any palpitations or dizziness. Review of Systems   Respiratory: Negative for shortness of breath. Cardiovascular: Negative for chest pain, palpitations, orthopnea, claudication, leg swelling and PND. All other systems reviewed and are negative. Physical Exam  Vitals and nursing note reviewed. Constitutional:       Appearance: Normal appearance. HENT:      Head: Normocephalic. Eyes:      Conjunctiva/sclera: Conjunctivae normal.   Neck:      Vascular: No carotid bruit. Cardiovascular:      Rate and Rhythm: Normal rate and regular rhythm. Pulmonary:      Breath sounds: Normal breath sounds. Abdominal:      Palpations: Abdomen is soft. Musculoskeletal:         General: No swelling. Cervical back: No rigidity. Skin:     General: Skin is warm and dry. Neurological:      General: No focal deficit present. Mental Status: She is alert and oriented to person, place, and time. Psychiatric:         Mood and Affect: Mood normal.         Behavior: Behavior normal.         PCP: Katerin Turner NP    Past Medical History:   Diagnosis Date    Headache     High cholesterol     Hypertension        Past Surgical History:   Procedure Laterality Date    HX OTHER SURGICAL      right fallopian tube removal    HX PARTIAL HYSTERECTOMY         Current Outpatient Medications   Medication Sig Dispense Refill    ticagrelor (BRILINTA) 90 mg tablet Take 1 Tab by mouth every twelve (12) hours every twelve (12) hours. 270 Tab 3    triamterene-hydroCHLOROthiazide (DYAZIDE) 37.5-25 mg per capsule Take 1 Cap by mouth.  ezetimibe (ZETIA) 10 mg tablet Take 1 Tab by mouth daily. 90 Tab 1    atorvastatin (LIPITOR) 80 mg tablet Take 1 Tab by mouth nightly. 90 Tab 3    isosorbide mononitrate ER (IMDUR) 30 mg tablet Take 1 Tab by mouth daily.  90 Tab 3    metoprolol succinate (TOPROL-XL) 50 mg XL tablet Take 1 Tab by mouth daily. 90 Tab 3    acetaminophen (TYLENOL) 500 mg tablet Take 1 Tab by mouth every six (6) hours as needed. 30 Tab 0    aspirin 81 mg chewable tablet Take 1 Tab by mouth daily. 30 Tab 0    pantoprazole (PROTONIX) 40 mg tablet Take one tablet by mouth before breakfast and dinner daily for two days an then daily before dinner 32 Tab 0    polyethylene glycol (MIRALAX) 17 gram packet Take 1 Packet by mouth daily as needed (constipation). 30 Packet 0    venlafaxine (EFFEXOR) 37.5 mg tablet Take 37.5 mg by mouth daily.  omega 3-DHA-EPA-fish oil 1,000 mg (120 mg-180 mg) capsule Take 1 Cap by mouth daily.  Cetirizine 10 mg cap Take  by mouth. The patient has a family history of    Social History     Tobacco Use    Smoking status: Never Smoker    Smokeless tobacco: Never Used   Vaping Use    Vaping Use: Never used   Substance Use Topics    Alcohol use: Yes     Comment: occasional use    Drug use: No       Lab Results   Component Value Date/Time    Cholesterol, total 148 03/19/2019 01:21 PM    HDL Cholesterol 68 (H) 03/19/2019 01:21 PM    LDL, calculated 63.8 03/19/2019 01:21 PM    Triglyceride 81 03/19/2019 01:21 PM    CHOL/HDL Ratio 2.2 03/19/2019 01:21 PM        BP Readings from Last 3 Encounters:   07/20/21 118/80   11/23/20 132/78   01/22/20 140/84        Pulse Readings from Last 3 Encounters:   07/20/21 67   06/23/21 69   11/23/20 (!) 58       Wt Readings from Last 3 Encounters:   07/20/21 74.8 kg (165 lb)   06/23/21 73.9 kg (163 lb)   11/23/20 72.1 kg (159 lb)         EKG: unchanged from previous tracings, normal sinus rhythm, rR' pattern, nonspecific T wave changes, consistent with incomplete RBBB.

## 2021-07-20 NOTE — PROGRESS NOTES
Glo Cheatham presents today for   Chief Complaint   Patient presents with    Follow-up     8 month f/u       Jose Mariajuwan Cheatham preferred language for health care discussion is english/other. Is someone accompanying this pt? no    Is the patient using any DME equipment during [address]? no    Depression Screening:  3 most recent PHQ Screens 6/23/2021   Little interest or pleasure in doing things Not at all   Feeling down, depressed, irritable, or hopeless Not at all   Total Score PHQ 2 0       Learning Assessment:  No flowsheet data found. Abuse Screening:  No flowsheet data found. Fall Risk  Fall Risk Assessment, last 12 mths 6/23/2021   Able to walk? Yes   Fall in past 12 months? 0   Do you feel unsteady? 0   Are you worried about falling 0       Pt currently taking Anticoagulant therapy? Brilinta 90mg    Coordination of Care:  1. Have you been to the ER, urgent care clinic since your last visit? Hospitalized since your last visit? no    2. Have you seen or consulted any other health care providers outside of the [address] [place] [address] since your last visit? Include any pap smears or colon screening.  no

## 2021-11-30 ENCOUNTER — HOSPITAL ENCOUNTER (OUTPATIENT)
Dept: PHYSICAL THERAPY | Age: 66
Discharge: HOME | End: 2021-11-30
Payer: MEDICARE

## 2021-11-30 PROCEDURE — 97162 PT EVAL MOD COMPLEX 30 MIN: CPT

## 2021-11-30 PROCEDURE — 97110 THERAPEUTIC EXERCISES: CPT

## 2021-11-30 PROCEDURE — 97535 SELF CARE MNGMENT TRAINING: CPT
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
## 2021-12-10 ENCOUNTER — HOSPITAL ENCOUNTER (OUTPATIENT)
Dept: PHYSICAL THERAPY | Age: 66
Discharge: HOME | End: 2021-12-10
Payer: MEDICARE

## 2021-12-10 PROCEDURE — 97110 THERAPEUTIC EXERCISES: CPT

## 2021-12-10 PROCEDURE — 97112 NEUROMUSCULAR REEDUCATION: CPT

## 2021-12-10 NOTE — PROGRESS NOTES
PT DAILY TREATMENT NOTE     Patient Name: Janay Webster  Date:12/10/2021  : 1955  [x]  Patient  Verified  Payor: Lissette Mariano / Plan: Mocavo Divers / Product Type: Managed Care Medicare /    In time:1:23  Out time:2:23  Total Treatment Time (min): 60  Visit #: 2 of 10    Medicare/BCBS Only   Total Timed Codes (min):  50 1:1 Treatment Time:  45       Treatment Area: Neck pain [M54.2]    SUBJECTIVE  Pain Level (0-10 scale): 4  Any medication changes, allergies to medications, adverse drug reactions, diagnosis change, or new procedure performed?: [x] No    [] Yes (see summary sheet for update)  Subjective functional status/changes:   [] No changes reported  Patient reports having most of her pain in her left wrist.      OBJECTIVE    Modality rationale: decrease pain to improve the patients ability to decrease difficulty while performing tasks. Min Type Additional Details   10 []  Ice     [x]  heat  []  Ice massage  []  Laser   []  Anodyne Position:sitting  Location:neck    [] Skin assessment post-treatment:  []intact []redness- no adverse reaction    []redness  adverse reaction:       42 min Therapeutic Exercise:  [x] See flow sheet :   Rationale: increase ROM, increase strength, improve coordination, improve balance and increase proprioception to improve the patients ability to increase ease with overhead reaching. 8 min Manual Therapy:    Supine- SOR and cervical distraction  Supine- manual UT/LS stretch   The manual therapy interventions were performed at a separate and distinct time from the therapeutic activities interventions. Rationale: decrease pain, increase ROM, increase tissue extensibility and decrease trigger points to increase ease with overhead looking.              With   [] TE   [] TA   [] neuro   [] other: Patient Education: [x] Review HEP    [] Progressed/Changed HEP based on:   [] positioning   [] body mechanics   [] transfers   [] heat/ice application    [] other:      Other Objective/Functional Measures:  pt reports performing HEP. Pain Level (0-10 scale) post treatment: 3    ASSESSMENT/Changes in Function: Initiated exercises per POC. Modified open books on when right sidelying with left UE bent due to increase in pain. Patient will continue to benefit from skilled PT services to modify and progress therapeutic interventions, address functional mobility deficits, address ROM deficits, address strength deficits, analyze and address soft tissue restrictions and analyze and cue movement patterns to attain remaining goals. []  See Plan of Care  []  See progress note/recertification  []  See Discharge Summary         Progress towards goals / Updated goals:  Short Term Goals: To be accomplished in 2 treatments:  1. Pt will report compliance and independence to HEP to help the pt manage their pain and symptoms. Eval: established   Current: MET: pt reports performing HEP. 12/10/21  Long Term Goals: To be accomplished in 10 treatments:  1. Pt will increase FOTO score to 61 points to improve ability to perform ADLs. Eval: 45 points  2. Pt will report at least 50% improvement in pain/symptoms to improve ability to tolerate daily tasks. Eval: 0%  3. Pt will increase AROM c/s EXT to 35 degs, B SB to 25 degs, right rotation to 65 degs, left rotation to 70 degs to improve ability to perform household tasks. Eval: EXT 29 degs with left temple pain, right SB 20 degs with stiffness, left SB 18 degs, right rotation 55 degs with stiffness, left rotation 63 degs. 4. Pt will report having 4 or less HAs over the past week to improve ability to pain with less pain. Eval: daily HAs around the left temple and base of the head.      PLAN  []  Upgrade activities as tolerated     [x]  Continue plan of care  []  Update interventions per flow sheet       []  Discharge due to:_  []  Other:_      Blanca Dodd PTA 12/10/2021  1:26 PM    Future Appointments   Date Time Provider Nilo Limon   1/18/2022 11:00 AM Lucinda Wells MD [facility] BS AMB

## 2021-12-22 ENCOUNTER — HOSPITAL ENCOUNTER (OUTPATIENT)
Dept: PHYSICAL THERAPY | Age: 66
Discharge: HOME | End: 2021-12-22
Payer: MEDICARE

## 2021-12-22 PROCEDURE — 97140 MANUAL THERAPY 1/> REGIONS: CPT

## 2021-12-22 PROCEDURE — 97110 THERAPEUTIC EXERCISES: CPT

## 2021-12-22 NOTE — PROGRESS NOTES
PT DAILY TREATMENT NOTE     Patient Name: Trini Sullivan  Date:2021  : 1955  [x]  Patient  Verified  Payor: Bessydavid Chaudhary / Plan: Kristen Hernandez / Product Type: Managed Care Medicare /    In time:12:13  Out time:1:07  Total Treatment Time (min): 47  Visit #: 3 of 10    Medicare/BCBS Only   Total Timed Codes (min):  44 1:1 Treatment Time:  44       Treatment Area: Neck pain [M54.2]    SUBJECTIVE  Pain Level (0-10 scale): 7  Any medication changes, allergies to medications, adverse drug reactions, diagnosis change, or new procedure performed?: [x] No    [] Yes (see summary sheet for update)  Subjective functional status/changes:   [] No changes reported  Patient reports she has a sore tooth and her wrist both cause an increase in neck pain. OBJECTIVE              Modality rationale: decrease pain to improve the patients ability to decrease difficulty while performing tasks. Min Type Additional Details    10 []? Ice     [x]? heat  []? Ice massage  []? Laser   []? Anodyne Position:sitting  Location:neck     []? Skin assessment post-treatment:  []?intact []? redness- no adverse reaction    []? redness  adverse reaction:         36 min Therapeutic Exercise:  [x]? See flow sheet :   Rationale: increase ROM, increase strength, improve coordination, improve balance and increase proprioception to improve the patients ability to increase ease with overhead reaching.            8 min Manual Therapy:    Supine- SOR and cervical distraction  Supine- manual UT/LS stretch   The manual therapy interventions were performed at a separate and distinct time from the therapeutic activities interventions.   Rationale: decrease pain, increase ROM, increase tissue extensibility and decrease trigger points to increase ease with overhead looking.                With   [] TE   [] TA   [] neuro   [] other: Patient Education: [x] Review HEP    [] Progressed/Changed HEP based on:   [] positioning   [] body mechanics   [] transfers   [] heat/ice application    [] other:      Other Objective/Functional Measures: pt reports 2 HA in the last week. Pain Level (0-10 scale) post treatment: 5    ASSESSMENT/Changes in Function: Patient has improved with overall headaches, but reports her new onset of tooth ache has also been contributing to headaches and uses Tylenol daily. Patient will continue to benefit from skilled PT services to modify and progress therapeutic interventions, address functional mobility deficits, address ROM deficits, address strength deficits, analyze and address soft tissue restrictions and analyze and cue movement patterns to attain remaining goals. []  See Plan of Care  []  See progress note/recertification  []  See Discharge Summary         Progress towards goals / Updated goals:  Short Term Goals: To be accomplished in 2 treatments:  1. Pt will report compliance and independence to HEP to help the pt manage their pain and symptoms.             Eval: established   Current: MET: pt reports performing HEP. 12/10/21  Long Term Goals: To be accomplished in 10 treatments:  1. Pt will increase FOTO score to 61 points to improve ability to perform ADLs. Eval: 45 points  2. Pt will report at least 50% improvement in pain/symptoms to improve ability to tolerate daily tasks. Eval: 0%  3. Pt will increase AROM c/s EXT to 35 degs, B SB to 25 degs, right rotation to 65 degs, left rotation to 70 degs to improve ability to perform household tasks. Eval: EXT 29 degs with left temple pain, right SB 20 degs with stiffness, left SB 18 degs, right rotation 55 degs with stiffness, left rotation 63 degs. 4. Pt will report having 4 or less HAs over the past week to improve ability to pain with less pain. Eval: daily HAs around the left temple and base of the head. Current: MET: pt reports 2 HA in the last week.  12/22/21        PLAN  []  Upgrade activities as tolerated     [x] Continue plan of care  []  Update interventions per flow sheet       []  Discharge due to:_  []  Other:_      Merissa Waddell PTA 12/22/2021  12:16 PM    Future Appointments   Date Time Provider Nilo Limon   12/22/2021 12:30 PM Joshua Wang PTA [facility]   12/23/2021  2:00 PM Gena Foster PT [facility]   12/28/2021  2:00 PM Yuliya Mensah [facility]   1/18/2022 11:00 AM Lazara Lee MD [facility]

## 2021-12-23 ENCOUNTER — APPOINTMENT (OUTPATIENT)
Dept: PHYSICAL THERAPY | Age: 66
End: 2021-12-23
Payer: MEDICARE

## 2021-12-23 ENCOUNTER — HOSPITAL ENCOUNTER (OUTPATIENT)
Dept: PHYSICAL THERAPY | Age: 66
Discharge: HOME | End: 2021-12-23
Payer: MEDICARE

## 2021-12-23 PROCEDURE — 97110 THERAPEUTIC EXERCISES: CPT | Performed by: PHYSICAL THERAPIST

## 2021-12-23 PROCEDURE — 97140 MANUAL THERAPY 1/> REGIONS: CPT | Performed by: PHYSICAL THERAPIST

## 2021-12-23 NOTE — PROGRESS NOTES
PT DAILY TREATMENT NOTE     Patient Name: Jennifer Doing  Date:2021  : 1955  [x]  Patient  Verified  Payor: Ne Taylor / Plan: Mario Gomez / Product Type: Managed Care Medicare /    In time:2:05  Out time:3:04  Total Treatment Time (min): 61  Visit #: 4 of 10    Medicare/BCBS Only   Total Timed Codes (min):  49 1:1 Treatment Time:  49       Treatment Area: Neck pain [M54.2]    SUBJECTIVE  Pain Level (0-10 scale): 6  Any medication changes, allergies to medications, adverse drug reactions, diagnosis change, or new procedure performed?: [x] No    [] Yes (see summary sheet for update)  Subjective functional status/changes:   [] No changes reported  Patient continues to note neck pain. States moderate pain persists. She indicates the pain runs from the right shoulder to the right occipital area. OBJECTIVE    Modality rationale: decrease pain to improve the patients ability to increase tolerance to activity.    Min Type Additional Details    [] Estim:  []Unatt       []IFC  []Premod                        []Other:  []w/ice   []w/heat  Position:  Location:    [] Estim: []Att    []TENS instruct  []NMES                    []Other:  []w/US   []w/ice   []w/heat  Position:  Location:    []  Traction: [] Cervical       []Lumbar                       [] Prone          []Supine                       []Intermittent   []Continuous Lbs:  [] before manual  [] after manual    []  Ultrasound: []Continuous   [] Pulsed                           []1MHz   []3MHz W/cm2:  Location:    []  Iontophoresis with dexamethasone         Location: [] Take home patch   [] In clinic   10 []  Ice     [x]  heat  []  Ice massage  []  Laser   []  Anodyne Position: sitting  Location: neck/shoulder    []  Laser with stim  []  Other:  Position:  Location:    []  Vasopneumatic Device    []  Right     []  Left  Pre-treatment girth:  Post-treatment girth:  Measured at (location):  Pressure:       [] lo [] med [] hi   Temperature: [] lo [] med [] hi   [] Skin assessment post-treatment:  []intact []redness- no adverse reaction    []redness  adverse reaction:     39 min Therapeutic Exercise:  [x]? ? See flow sheet :   Rationale: increase ROM, increase strength, improve coordination, improve balance and increase proprioception to improve the patients ability to increase ease with overhead reaching.      10 min Manual Therapy:    Supine- SOR and cervical distraction  Supine- manual UT/LS stretch   The manual therapy interventions were performed at a separate and distinct time from the therapeutic activities interventions. Rationale: decrease pain, increase ROM, increase tissue extensibility and decrease trigger points to increase ease with overhead looking.           With   [] TE   [] TA   [] neuro   [] other: Patient Education: [x] Review HEP    [] Progressed/Changed HEP based on:   [] positioning   [] body mechanics   [] transfers   [] heat/ice application    [] other:      Other Objective/Functional Measures: Tenderness along the right lateral C/S and into the right UT with trigger points noted right UT. Pain Level (0-10 scale) post treatment: 0    ASSESSMENT/Changes in Function: Patient with continued neck pain and trigger points. Patient will continue to benefit from skilled PT services to modify and progress therapeutic interventions, address functional mobility deficits, address ROM deficits, address strength deficits, analyze and address soft tissue restrictions and analyze and cue movement patterns to attain remaining goals. [x]  See Plan of Care  []  See progress note/recertification  []  See Discharge Summary         Progress towards goals / Updated goals:  Short Term Goals: To be accomplished in 2 treatments:  1. Pt will report compliance and independence to HEP to help the pt manage their pain and symptoms.             Eval: established   Current: MET: pt reports performing HEP. 12/10/21    Long Term Goals: To be accomplished in 10 treatments:  1. Pt will increase FOTO score to 61 points to improve ability to perform ADLs. Eval: 45 points  2. Pt will report at least 50% improvement in pain/symptoms to improve ability to tolerate daily tasks. Eval: 0%  Current:  Minimal improvement noted, her left wrist is a \"hinderance\". 12/23/2021. No change. 3. Pt will increase AROM c/s EXT to 35 degs, B SB to 25 degs, right rotation to 65 degs, left rotation to 70 degs to improve ability to perform household tasks. Eval: EXT 29 degs with left temple pain, right SB 20 degs with stiffness, left SB 18 degs, right rotation 55 degs with stiffness, left rotation 63 degs. 4. Pt will report having 4 or less HAs over the past week to improve ability to pain with less pain. Eval: daily HAs around the left temple and base of the head. Current: MET: pt reports 2 HA in the last week.  12/22/21      PLAN  [x]  Upgrade activities as tolerated     [x]  Continue plan of care  []  Update interventions per flow sheet       []  Discharge due to:_  []  Other:_      Marilee Finn, PT 12/23/2021  2:13 PM    Future Appointments   Date Time Provider Nilo Limon   12/28/2021  2:00 PM [facility]   1/18/2022 11:00 AM Aury Hernandez MD [facility] AM[facility]

## 2021-12-28 ENCOUNTER — APPOINTMENT (OUTPATIENT)
Dept: PHYSICAL THERAPY | Age: 66
End: 2021-12-28
Payer: MEDICARE

## 2021-12-29 ENCOUNTER — HOSPITAL ENCOUNTER (OUTPATIENT)
Dept: PHYSICAL THERAPY | Age: 66
Discharge: HOME | End: 2021-12-29
Payer: MEDICARE

## 2021-12-29 PROCEDURE — 97530 THERAPEUTIC ACTIVITIES: CPT

## 2021-12-29 PROCEDURE — 97110 THERAPEUTIC EXERCISES: CPT

## 2021-12-29 NOTE — PROGRESS NOTES
PT DISCHARGE DAILY NOTE AND [id number]    Patient name: Tricia Edwards Start of Care: 2021   Referral source: Joel Andrade, * : 1955               Medical Diagnosis: Neck pain [M54.2]  Payor: John Zarate / Plan: XMPie / Product Type: Managed Care Medicare /  Onset Date: 11/3/2021               Treatment Diagnosis: neck pain, left UE tingling   Prior Hospitalization: see medical history Provider#: [id number]   Medications: Verified on Patient summary List    Comorbidities: HTN   Prior Level of Function: Independent with ADls, functional, and recreational tasks with no limitations. Visits from Start of Care: 5    Missed Visits: 0  Reporting Period : 2021 to 2021    Date:2021  : 1955  [x]  Patient  Verified  Payor: John Zarate / Plan: XMPie / Product Type: [employer] Medicare /    In time: 2:55  Out time:3:41  Total Treatment Time (min): 46  Visit #: 5 of 10    Medicare/BCBS Only   Total Timed Codes (min):  46 1:1 Treatment Time:  46       SUBJECTIVE  Pain Level (0-10 scale): 0 \"stiff\"  Any medication changes, allergies to medications, adverse drug reactions, diagnosis change, or new procedure performed?: [x] No    [] Yes (see summary sheet for update)  Subjective functional status/changes:   [] No changes reported  Pt reports her neck is improving overall but continues to have complaints of left wrist/hand pain. She reports having a dental procedure performed yesterday which helped with some of the pain as well. OBJECTIVE    18 min Therapeutic Exercise:  [x] See flow sheet : exercises, HEP instruction   Rationale: increase ROM and increase strength to improve the patients ability to tolerate functional tasks.      28 min Therapeutic Activity:  []  See flow sheet : goal assessment, FOTO with pt, d/c instruction, pt education on following up with a physician regarding possible OT for her wrist/hand. Rationale: increase ROM and increase strength  to improve the patients ability to tolerate daily activities. With   [x] TE   [x] TA   [] neuro   [] other: Patient Education: [x] Review HEP    [] Progressed/Changed HEP based on:   [x] positioning   [x] body mechanics   [] transfers   [] heat/ice application    [] other:      Other Objective/Functional Measures: See goals below. Pain Level (0-10 scale) post treatment: 0 \"stiff\"    Summary of Care:  Goal: Pt will report compliance and independence to HEP to help the pt manage their pain and symptoms.   Status at last note/certification: MET: pt reports performing HEP. Status at discharge: met    Goal: Pt will increase FOTO score to 61 points to improve ability to perform ADLs. Status at last note/certification: progressing, 53 points  Status at discharge: not met    Goal: Pt will report at least 50% improvement in pain/symptoms to improve ability to tolerate daily tasks. Status at last note/certification: MET, 68% improvement  Status at discharge: met    Goal: Pt will increase AROM c/s EXT to 35 degs, B SB to 25 degs, right rotation to 65 degs, left rotation to 70 degs to improve ability to perform household tasks. Status at last note/certification: progressing, EXT 34 degs, left SB 28 degs, right SB 23 degs, left rotation WNL, right rotation 64 degs   Status at discharge: not met    Goal: Pt will report having 4 or less HAs over the past week to improve ability to pain with less pain. Status at last note/certification: MET: pt reports 2 HA in the last week  Status at discharge: met    ASSESSMENT/Changes in Function:   Pt was seen for 5 therapy sessions. Pt demonstrated/reported improvements in ROM, pain, muscle tightness, and mobility since starting therapy. Pt reports 75% improvement in symptoms since start of care.  Pt given updated HEP to perform and was educated to follow up with the physician regarding possible OT for her left wrist/hand pain. Pt states she feels ready to d/c to HEP. Pt is d/c'ed from therapy at this time to HEP secondary to improvement in pain/symptoms and ability to independently perform HEP to manage current deficits.      Thank you for this referral!      PLAN  [x]Discontinue therapy: [x]Patient has reached or is progressing toward set goals      []Patient is non-compliant or has abdicated      []Due to lack of appreciable progress towards set goals    Elvis Chaparro, PT 12/29/2021  2:59 PM

## 2021-12-29 NOTE — PROGRESS NOTES
Physical Therapy Discharge Instructions      [facility]                                                                          [address] [facility], [address]   [phone] fax    Patient: Harshil Farley  : 1955      Continue Home Exercise Program 4 times per week.         Continue with    [x] Heat  as needed              Follow up with MD:     [x] As needed        Recommendations:   [x]   Return to activity with home program          Rayne Zacarias, PT 2021 3:33 PM

## 2022-01-18 ENCOUNTER — OFFICE VISIT (OUTPATIENT)
Dept: CARDIOLOGY CLINIC | Age: 67
End: 2022-01-18
Payer: MEDICARE

## 2022-01-18 VITALS
OXYGEN SATURATION: 97 % | BODY MASS INDEX: 31.53 KG/M2 | HEIGHT: 61 IN | WEIGHT: 167 LBS | SYSTOLIC BLOOD PRESSURE: 156 MMHG | DIASTOLIC BLOOD PRESSURE: 84 MMHG | HEART RATE: 62 BPM

## 2022-01-18 DIAGNOSIS — I25.10 CORONARY ARTERY DISEASE INVOLVING NATIVE CORONARY ARTERY OF NATIVE HEART WITHOUT ANGINA PECTORIS: Primary | ICD-10-CM

## 2022-01-18 PROCEDURE — 99214 OFFICE O/P EST MOD 30 MIN: CPT | Performed by: INTERNAL MEDICINE

## 2022-01-18 PROCEDURE — G8510 SCR DEP NEG, NO PLAN REQD: HCPCS | Performed by: INTERNAL MEDICINE

## 2022-01-18 PROCEDURE — G8427 DOCREV CUR MEDS BY ELIG CLIN: HCPCS | Performed by: INTERNAL MEDICINE

## 2022-01-18 PROCEDURE — 1101F PT FALLS ASSESS-DOCD LE1/YR: CPT | Performed by: INTERNAL MEDICINE

## 2022-01-18 PROCEDURE — 1090F PRES/ABSN URINE INCON ASSESS: CPT | Performed by: INTERNAL MEDICINE

## 2022-01-18 PROCEDURE — G8536 NO DOC ELDER MAL SCRN: HCPCS | Performed by: INTERNAL MEDICINE

## 2022-01-18 PROCEDURE — G8417 CALC BMI ABV UP PARAM F/U: HCPCS | Performed by: INTERNAL MEDICINE

## 2022-01-18 PROCEDURE — G8400 PT W/DXA NO RESULTS DOC: HCPCS | Performed by: INTERNAL MEDICINE

## 2022-01-18 PROCEDURE — 93000 ELECTROCARDIOGRAM COMPLETE: CPT | Performed by: INTERNAL MEDICINE

## 2022-01-18 PROCEDURE — 3017F COLORECTAL CA SCREEN DOC REV: CPT | Performed by: INTERNAL MEDICINE

## 2022-01-18 RX ORDER — LOSARTAN POTASSIUM 50 MG/1
50 TABLET ORAL DAILY
Qty: 30 TABLET | Refills: 6 | Status: SHIPPED | OUTPATIENT
Start: 2022-01-18 | End: 2022-10-10

## 2022-01-18 NOTE — PROGRESS NOTES
Ward Wise Carrie Koch presents today for   Chief Complaint   Patient presents with    Follow-up     6 month       Daniela Beatty preferred language for health care discussion is english/other. Is someone accompanying this pt? no    Is the patient using any DME equipment during [address]? no    Depression Screening:  3 most recent PHQ Screens 1/18/2022   Little interest or pleasure in doing things Not at all   Feeling down, depressed, irritable, or hopeless Not at all   Total Score PHQ 2 0       Learning Assessment:  Learning Assessment 1/18/2022   PRIMARY LEARNER Patient   PRIMARY LANGUAGE ENGLISH   LEARNER PREFERENCE PRIMARY DEMONSTRATION   ANSWERED BY patient   RELATIONSHIP SELF       Abuse Screening:  Abuse Screening Questionnaire 1/18/2022   Do you ever feel afraid of your partner? N   Are you in a relationship with someone who physically or mentally threatens you? N   Is it safe for you to go home? Y       Fall Risk  Fall Risk Assessment, last 12 mths 1/18/2022   Able to walk? Yes   Fall in past 12 months? 0   Do you feel unsteady? 0   Are you worried about falling 0           Pt currently taking Anticoagulant therapy? no    Pt currently taking Antiplatelet therapy ? Aspirin 81 mg      Coordination of Care:  1. Have you been to the ER, urgent care clinic since your last visit? Hospitalized since your last visit? no    2. Have you seen or consulted any other health care providers outside of the [facility] since your last visit? Include any pap smears or colon screening.  no

## 2022-01-18 NOTE — PROGRESS NOTES
HISTORY OF PRESENT ILLNESS  Cheryle Savant is a 73 y.o. female. ASSESSMENT and PLAN    Ms. Staci Alberts has history of CAD. In November 2018, she presented to the hospital with chest pains and NSTEMI. She had left-sided neck pain, vomiting as well as epigastric pain. Coronary angiography revealed distal left main 35%, LAD 40%, small diffusely diseased circumflex, 75% ramus intermedius with culprit lesion being ostial RPDA hazy, thrombotic 99% dissected lesion. She had total obstruction of the distal AV circumflex which was collateralized from the distal RCA. The culprit ostial RPDA lesion was stented using 2.75 mm ELIZABETH. Ejection fraction at that time revealed 45-50% with inferior, posterior hypokinesis. · CAD:    Clinically stable. · BP:    Upper normal to mildly elevated. She had been on Imdur in the past.  She is not currently on this medication. In place of Imdur, I have initiated Cozaar 50 mg daily. If she is able to lose weight and her blood pressure is better controlled, we may be able to come off of Cozaar. · Rhythm:    Stable sinus. · CHF:    There is no evidence of decompensated CHF noted. · Weight:     Her weight today is 167 pounds. Her baseline weight is 160 pounds. After her hospitalization in 2018, her weight was 148 pounds. I have encouraged weight loss and control. If she is successful in weight control, and her blood pressure comes down, we may be able to wean off of Cozaar. · Cholesterol:   Target LDL <70. Lipitor 80, Zetia 10. · Anti-platelet:   Remains on ASA. I will see her back in 6 months. Thank you. Encounter Diagnoses   Name Primary?     Coronary artery disease involving native coronary artery of native heart without angina pectoris Yes     current treatment plan is effective, no change in therapy  lab results and schedule of future lab studies reviewed with patient  reviewed diet, exercise and weight control  cardiovascular risk and specific lipid/LDL goals reviewed  use of aspirin to prevent MI and TIA's discussed      HPI   Today, Ms. Ceci Molina has no complaints of chest pains. She denies any changes in her activity levels. She remains active physically. She uses a treadmill on a regular basis. She denies any changes in her dyspnea on exertion. She denies any exertional chest pains. She denies orthopnea or PND. She denies any palpitations or dizziness. Review of Systems   Respiratory: Negative for shortness of breath. Cardiovascular: Negative for chest pain, palpitations, orthopnea, claudication, leg swelling and PND. All other systems reviewed and are negative. Physical Exam  Vitals and nursing note reviewed. HENT:      Head: Normocephalic. Eyes:      Conjunctiva/sclera: Conjunctivae normal.   Neck:      Vascular: No carotid bruit. Cardiovascular:      Rate and Rhythm: Normal rate and regular rhythm. Pulmonary:      Breath sounds: Normal breath sounds. Abdominal:      Palpations: Abdomen is soft. Musculoskeletal:         General: No swelling. Cervical back: No rigidity. Skin:     General: Skin is warm and dry. Neurological:      General: No focal deficit present. Mental Status: She is alert and oriented to person, place, and time. Psychiatric:         Mood and Affect: Mood normal.         Behavior: Behavior normal.         PCP: William Lundborg, NP    Past Medical History:   Diagnosis Date    Headache     High cholesterol     Hypertension        Past Surgical History:   Procedure Laterality Date    HX OTHER SURGICAL      right fallopian tube removal    HX PARTIAL HYSTERECTOMY         Current Outpatient Medications   Medication Sig Dispense Refill    triamterene-hydroCHLOROthiazide (DYAZIDE) 37.5-25 mg per capsule Take 1 Cap by mouth.  ezetimibe (ZETIA) 10 mg tablet Take 1 Tab by mouth daily. 90 Tab 1    atorvastatin (LIPITOR) 80 mg tablet Take 1 Tab by mouth nightly.  90 Tab 3    metoprolol succinate (TOPROL-XL) 50 mg XL tablet Take 1 Tab by mouth daily. 90 Tab 3    aspirin 81 mg chewable tablet Take 1 Tab by mouth daily. 30 Tab 0    pantoprazole (PROTONIX) 40 mg tablet Take one tablet by mouth before breakfast and dinner daily for two days an then daily before dinner 32 Tab 0    polyethylene glycol (MIRALAX) 17 gram packet Take 1 Packet by mouth daily as needed (constipation). 30 Packet 0    venlafaxine (EFFEXOR) 37.5 mg tablet Take 35.5 mg by mouth daily.  omega 3-DHA-EPA-fish oil 1,000 mg (120 mg-180 mg) capsule Take 1 Cap by mouth daily.  Cetirizine 10 mg cap Take  by mouth.  ticagrelor (BRILINTA) 90 mg tablet Take 1 Tab by mouth every twelve (12) hours every twelve (12) hours. (Patient not taking: Reported on 1/18/2022) 270 Tab 3    isosorbide mononitrate ER (IMDUR) 30 mg tablet Take 1 Tab by mouth daily. (Patient not taking: Reported on 1/18/2022) 90 Tab 3    acetaminophen (TYLENOL) 500 mg tablet Take 1 Tab by mouth every six (6) hours as needed.  (Patient not taking: Reported on 1/18/2022) 30 Tab 0       The patient has a family history of    Social History     Tobacco Use    Smoking status: Never Smoker    Smokeless tobacco: Never Used   Vaping Use    Vaping Use: Never used   Substance Use Topics    Alcohol use: Yes     Comment: occasional use    Drug use: No       Lab Results   Component Value Date/Time    Cholesterol, total 148 03/19/2019 01:21 PM    HDL Cholesterol 68 (H) 03/19/2019 01:21 PM    LDL, calculated 63.8 03/19/2019 01:21 PM    Triglyceride 81 03/19/2019 01:21 PM    CHOL/HDL Ratio 2.2 03/19/2019 01:21 PM        BP Readings from Last 3 Encounters:   01/18/22 (!) 156/84   07/20/21 118/80   11/23/20 132/78        Pulse Readings from Last 3 Encounters:   01/18/22 62   07/20/21 67   06/23/21 69       Wt Readings from Last 3 Encounters:   01/18/22 75.8 kg (167 lb)   07/20/21 74.8 kg (165 lb)   06/23/21 73.9 kg (163 lb)         EKG: unchanged from previous tracings, normal sinus rhythm, nonspecific ST and T waves changes, 1st degree AV block.

## 2022-03-10 ENCOUNTER — OFFICE VISIT (OUTPATIENT)
Dept: ORTHOPEDIC SURGERY | Age: 67
End: 2022-03-10
Payer: MEDICARE

## 2022-03-10 VITALS — WEIGHT: 167 LBS | HEIGHT: 61 IN | BODY MASS INDEX: 31.53 KG/M2

## 2022-03-10 DIAGNOSIS — M25.532 LEFT WRIST PAIN: ICD-10-CM

## 2022-03-10 DIAGNOSIS — M65.4 DE QUERVAIN'S TENOSYNOVITIS, LEFT: Primary | ICD-10-CM

## 2022-03-10 PROCEDURE — 1101F PT FALLS ASSESS-DOCD LE1/YR: CPT | Performed by: ORTHOPAEDIC SURGERY

## 2022-03-10 PROCEDURE — G8536 NO DOC ELDER MAL SCRN: HCPCS | Performed by: ORTHOPAEDIC SURGERY

## 2022-03-10 PROCEDURE — 73110 X-RAY EXAM OF WRIST: CPT | Performed by: ORTHOPAEDIC SURGERY

## 2022-03-10 PROCEDURE — 1090F PRES/ABSN URINE INCON ASSESS: CPT | Performed by: ORTHOPAEDIC SURGERY

## 2022-03-10 PROCEDURE — G8427 DOCREV CUR MEDS BY ELIG CLIN: HCPCS | Performed by: ORTHOPAEDIC SURGERY

## 2022-03-10 PROCEDURE — G8432 DEP SCR NOT DOC, RNG: HCPCS | Performed by: ORTHOPAEDIC SURGERY

## 2022-03-10 PROCEDURE — 99214 OFFICE O/P EST MOD 30 MIN: CPT | Performed by: ORTHOPAEDIC SURGERY

## 2022-03-10 PROCEDURE — G8400 PT W/DXA NO RESULTS DOC: HCPCS | Performed by: ORTHOPAEDIC SURGERY

## 2022-03-10 PROCEDURE — G8417 CALC BMI ABV UP PARAM F/U: HCPCS | Performed by: ORTHOPAEDIC SURGERY

## 2022-03-10 PROCEDURE — 3017F COLORECTAL CA SCREEN DOC REV: CPT | Performed by: ORTHOPAEDIC SURGERY

## 2022-06-20 NOTE — PROGRESS NOTES
Eligio Rodríguez is a 73 y.o. female right handed retiree. Worker's Compensation and legal considerations: none filed. Vitals:    03/10/22 1035   Weight: 167 lb (75.8 kg)   Height: 5' 1\" (1.549 m)   PainSc:  10 - Worst pain ever   PainLoc: Wrist           Chief Complaint   Patient presents with    Wrist Pain     left wrist         HPI: Patient presents today with complaints of left wrist pain. Date of onset: Late 2021    Injury: No    Prior Treatment:  No    Numbness/ Tingling: No      ROS: Review of Systems - General ROS: negative  Psychological ROS: negative  ENT ROS: negative  Allergy and Immunology ROS: negative  Hematological and Lymphatic ROS: negative  Respiratory ROS: no cough, shortness of breath, or wheezing  Cardiovascular ROS: no chest pain or dyspnea on exertion  Gastrointestinal ROS: no abdominal pain, change in bowel habits, or black or bloody stools  Musculoskeletal ROS: negative  Neurological ROS: negative  Dermatological ROS: negative    Past Medical History:   Diagnosis Date    Headache     High cholesterol     Hypertension        Past Surgical History:   Procedure Laterality Date    HX OTHER SURGICAL      right fallopian tube removal    HX PARTIAL HYSTERECTOMY         Current Outpatient Medications   Medication Sig Dispense Refill    losartan (COZAAR) 50 mg tablet Take 1 Tablet by mouth daily. 30 Tablet 6    triamterene-hydroCHLOROthiazide (DYAZIDE) 37.5-25 mg per capsule Take 1 Cap by mouth.  ezetimibe (ZETIA) 10 mg tablet Take 1 Tab by mouth daily. 90 Tab 1    atorvastatin (LIPITOR) 80 mg tablet Take 1 Tab by mouth nightly. 90 Tab 3    metoprolol succinate (TOPROL-XL) 50 mg XL tablet Take 1 Tab by mouth daily. 90 Tab 3    aspirin 81 mg chewable tablet Take 1 Tab by mouth daily.  30 Tab 0    pantoprazole (PROTONIX) 40 mg tablet Take one tablet by mouth before breakfast and dinner daily for two days an then daily before dinner 32 Tab 0    polyethylene glycol (MIRALAX) 17 gram packet Take 1 Packet by mouth daily as needed (constipation). 30 Packet 0    venlafaxine (EFFEXOR) 37.5 mg tablet Take 35.5 mg by mouth daily.  omega 3-DHA-EPA-fish oil 1,000 mg (120 mg-180 mg) capsule Take 1 Cap by mouth daily.  Cetirizine 10 mg cap Take  by mouth.  ticagrelor (BRILINTA) 90 mg tablet Take 1 Tab by mouth every twelve (12) hours every twelve (12) hours. (Patient not taking: Reported on 1/18/2022) 270 Tab 3    isosorbide mononitrate ER (IMDUR) 30 mg tablet Take 1 Tab by mouth daily. (Patient not taking: Reported on 1/18/2022) 90 Tab 3    acetaminophen (TYLENOL) 500 mg tablet Take 1 Tab by mouth every six (6) hours as needed. (Patient not taking: Reported on 1/18/2022) 30 Tab 0       No Known Allergies        PE:     Physical Exam  Vitals and nursing note reviewed. Constitutional:       General: She is not in acute distress. Appearance: Normal appearance. She is not ill-appearing. Cardiovascular:      Pulses: Normal pulses. Pulmonary:      Effort: Pulmonary effort is normal. No respiratory distress. Musculoskeletal:         General: Swelling and tenderness present. No deformity or signs of injury. Cervical back: Normal range of motion and neck supple. Right lower leg: No edema. Left lower leg: No edema. Skin:     General: Skin is warm and dry. Capillary Refill: Capillary refill takes less than 2 seconds. Findings: No bruising or erythema. Neurological:      General: No focal deficit present. Mental Status: She is alert and oriented to person, place, and time.    Psychiatric:         Mood and Affect: Mood normal.         Behavior: Behavior normal.            Wrist:    Tenderness L R Test L R   1st Ext Comp ++ - Finkelstein's ++ -   Snuff Box - - Ball - -   2nd Ext Comp - - S-L Shear - -   S-L Joint - - L-T Shear - -   L-T Joint - - DRUJ Sup - -   6th Ext Comp - - DRUJ Pro - -   Ulnar Snuff - - DRUJ Grind - -   Fovea - - TFCC - -   STT Joint - - Mid-Carp Inst - -   FCR - - P-T Grind - -   Intersection - - ECU Sublux. - -      Dorsal Ganglion: -   Volar Ganglion: -      ROM: Full        Imaging:     3/10/2022 3 views of left wrist does not show any fracture dislocation or other osseous abnormalities. ICD-10-CM ICD-9-CM    1. De Quervain's tenosynovitis, left  M65.4 727.04 AMB SUPPLY ORDER   2. Left wrist pain  M25.532 719.43 AMB POC XRAY, WRIST; COMPLETE, 3+ VIE         Plan:     Patient would like to try brace first and hold off on injection. She reports some apprehension about receiving an injection today. She said she would let us know if she changes her mind. She stated understanding of the fact that there is a 90% chance of resolution with injection and strict brace wear and probably less than 50% chance of resolution with only a brace. Follow-up and Dispositions    · Return if symptoms worsen or fail to improve, for Possible injection.           Plan was reviewed with patient, who verbalized agreement and understanding of the plan Doxycycline Pregnancy And Lactation Text: This medication is Pregnancy Category D and not consider safe during pregnancy. It is also excreted in breast milk but is considered safe for shorter treatment courses.

## 2022-06-22 DIAGNOSIS — I10 ESSENTIAL HYPERTENSION: Primary | ICD-10-CM

## 2022-06-27 RX ORDER — ATORVASTATIN CALCIUM 80 MG/1
80 TABLET, FILM COATED ORAL
Qty: 90 TABLET | Refills: 3 | Status: SHIPPED | OUTPATIENT
Start: 2022-06-27

## 2022-07-01 ENCOUNTER — OFFICE VISIT (OUTPATIENT)
Dept: CARDIOLOGY CLINIC | Age: 67
End: 2022-07-01
Payer: MEDICARE

## 2022-07-01 VITALS
HEART RATE: 54 BPM | WEIGHT: 158.6 LBS | OXYGEN SATURATION: 98 % | BODY MASS INDEX: 29.97 KG/M2 | SYSTOLIC BLOOD PRESSURE: 110 MMHG | DIASTOLIC BLOOD PRESSURE: 68 MMHG

## 2022-07-01 DIAGNOSIS — I10 ESSENTIAL HYPERTENSION: Primary | ICD-10-CM

## 2022-07-01 PROCEDURE — 1123F ACP DISCUSS/DSCN MKR DOCD: CPT | Performed by: INTERNAL MEDICINE

## 2022-07-01 PROCEDURE — 1101F PT FALLS ASSESS-DOCD LE1/YR: CPT | Performed by: INTERNAL MEDICINE

## 2022-07-01 PROCEDURE — G8752 SYS BP LESS 140: HCPCS | Performed by: INTERNAL MEDICINE

## 2022-07-01 PROCEDURE — 1090F PRES/ABSN URINE INCON ASSESS: CPT | Performed by: INTERNAL MEDICINE

## 2022-07-01 PROCEDURE — G8400 PT W/DXA NO RESULTS DOC: HCPCS | Performed by: INTERNAL MEDICINE

## 2022-07-01 PROCEDURE — 99214 OFFICE O/P EST MOD 30 MIN: CPT | Performed by: INTERNAL MEDICINE

## 2022-07-01 PROCEDURE — G8536 NO DOC ELDER MAL SCRN: HCPCS | Performed by: INTERNAL MEDICINE

## 2022-07-01 PROCEDURE — 93000 ELECTROCARDIOGRAM COMPLETE: CPT | Performed by: INTERNAL MEDICINE

## 2022-07-01 PROCEDURE — G8510 SCR DEP NEG, NO PLAN REQD: HCPCS | Performed by: INTERNAL MEDICINE

## 2022-07-01 PROCEDURE — 3017F COLORECTAL CA SCREEN DOC REV: CPT | Performed by: INTERNAL MEDICINE

## 2022-07-01 PROCEDURE — G8754 DIAS BP LESS 90: HCPCS | Performed by: INTERNAL MEDICINE

## 2022-07-01 PROCEDURE — G8427 DOCREV CUR MEDS BY ELIG CLIN: HCPCS | Performed by: INTERNAL MEDICINE

## 2022-07-01 PROCEDURE — G8417 CALC BMI ABV UP PARAM F/U: HCPCS | Performed by: INTERNAL MEDICINE

## 2022-07-01 NOTE — PROGRESS NOTES
HISTORY OF PRESENT ILLNESS  Edward Bustos is a 78 y.o. female. ASSESSMENT and PLAN    Ms. Que Del Toro has history of CAD.  In November 2018, she presented to the hospital with chest pains and NSTEMI.  She had left-sided neck pain, vomiting as well as epigastric pain.  Coronary angiography revealed distal left main 35%, LAD 40%, small diffusely diseased circumflex, 75% ramus intermedius with culprit lesion being ostial RPDA hazy, thrombotic 99% dissected lesion.  She had total obstruction of the distal AV circumflex which was collateralized from the distal RCA.  The culprit ostial RPDA lesion was stented using 2.75 mm ELIZABETH.  Ejection fraction at that time revealed 45-50% with inferior, posterior hypokinesis. · CAD:    Clinically stable. · BP:    Well controlled with the weight loss. Her blood pressure is at 110/68. · Rhythm:    Asymptomatic sinus bradycardia at 54 bpm.  · CHF:    There is no evidence of decompensated CHF noted. · Weight:     Her weight today is 159 pounds. Her target weight is 150 pounds. · Cholesterol:   Target LDL <70. Lipitor 80, Zetia 10. · Anti-platelet:   Remains on ASA. Since Imdur is causing headaches, and her blood pressure is much better controlled without chest pain, I have discontinued Imdur for the time being. I will see her back in 6 months.  Thank you. Encounter Diagnoses   Name Primary?  Essential hypertension Yes     current treatment plan is effective, no change in therapy  lab results and schedule of future lab studies reviewed with patient  reviewed diet, exercise and weight control  cardiovascular risk and specific lipid/LDL goals reviewed  use of aspirin to prevent MI and TIA's discussed      HPI   Today, Ms. Francesco Sanchez has no complaints of chest pains. With careful dieting, she has lost about 10 pounds. Her target weight is around 150 pounds.   She denies any complaints of chest pains, increased shortness of breath, or decreased exercise capacity. She denies any orthopnea or PND. She denies any palpitations or dizziness. She has not been able to tolerate Imdur since it gives her headaches. With her weight loss, her blood pressure is much better controlled. Review of Systems   Respiratory: Negative for shortness of breath. Cardiovascular: Negative for chest pain, palpitations, orthopnea, claudication, leg swelling and PND. All other systems reviewed and are negative. Physical Exam  Vitals and nursing note reviewed. Constitutional:       Appearance: Normal appearance. HENT:      Head: Normocephalic. Eyes:      Conjunctiva/sclera: Conjunctivae normal.   Neck:      Vascular: No carotid bruit. Cardiovascular:      Rate and Rhythm: Normal rate and regular rhythm. Pulmonary:      Breath sounds: Normal breath sounds. Abdominal:      Palpations: Abdomen is soft. Musculoskeletal:         General: No swelling. Cervical back: No rigidity. Skin:     General: Skin is warm and dry. Neurological:      General: No focal deficit present. Mental Status: She is alert and oriented to person, place, and time. Psychiatric:         Mood and Affect: Mood normal.         Behavior: Behavior normal.         PCP: Leighann Gavin NP    Past Medical History:   Diagnosis Date    Headache     High cholesterol     Hypertension        Past Surgical History:   Procedure Laterality Date    HX OTHER SURGICAL      right fallopian tube removal    HX PARTIAL HYSTERECTOMY         Current Outpatient Medications   Medication Sig Dispense Refill    atorvastatin (LIPITOR) 80 mg tablet Take 1 Tablet by mouth nightly. 90 Tablet 3    losartan (COZAAR) 50 mg tablet Take 1 Tablet by mouth daily. 30 Tablet 6    triamterene-hydroCHLOROthiazide (DYAZIDE) 37.5-25 mg per capsule Take 1 Cap by mouth.  ezetimibe (ZETIA) 10 mg tablet Take 1 Tab by mouth daily.  90 Tab 1    metoprolol succinate (TOPROL-XL) 50 mg XL tablet Take 1 Tab by mouth daily. 90 Tab 3    acetaminophen (TYLENOL) 500 mg tablet Take 1 Tab by mouth every six (6) hours as needed. (Patient not taking: Reported on 1/18/2022) 30 Tab 0    aspirin 81 mg chewable tablet Take 1 Tab by mouth daily. 30 Tab 0    pantoprazole (PROTONIX) 40 mg tablet Take one tablet by mouth before breakfast and dinner daily for two days an then daily before dinner 32 Tab 0    polyethylene glycol (MIRALAX) 17 gram packet Take 1 Packet by mouth daily as needed (constipation). 30 Packet 0    venlafaxine (EFFEXOR) 37.5 mg tablet Take 35.5 mg by mouth daily.  omega 3-DHA-EPA-fish oil 1,000 mg (120 mg-180 mg) capsule Take 1 Cap by mouth daily.  Cetirizine 10 mg cap Take  by mouth. The patient has a family history of    Social History     Tobacco Use    Smoking status: Never Smoker    Smokeless tobacco: Never Used   Vaping Use    Vaping Use: Never used   Substance Use Topics    Alcohol use: Yes     Comment: occasional use    Drug use: No       Lab Results   Component Value Date/Time    Cholesterol, total 148 03/19/2019 01:21 PM    HDL Cholesterol 68 (H) 03/19/2019 01:21 PM    LDL, calculated 63.8 03/19/2019 01:21 PM    Triglyceride 81 03/19/2019 01:21 PM    CHOL/HDL Ratio 2.2 03/19/2019 01:21 PM        BP Readings from Last 3 Encounters:   07/01/22 110/68   01/18/22 (!) 156/84   07/20/21 118/80        Pulse Readings from Last 3 Encounters:   07/01/22 (!) 54   01/18/22 62   07/20/21 67       Wt Readings from Last 3 Encounters:   07/01/22 71.9 kg (158 lb 9.6 oz)   03/10/22 75.8 kg (167 lb)   01/18/22 75.8 kg (167 lb)         EKG: unchanged from previous tracings, nonspecific ST and T waves changes, sinus bradycardia.

## 2022-07-01 NOTE — PROGRESS NOTES
Identified pt with two pt identifiers(name and ). Reviewed record in preparation for visit and have obtained necessary documentation. Anju Knee presents today for   Chief Complaint   Patient presents with    Follow-up     6 month f/u        Pt denies  DIZZINESS, SOB, CHEST PAIN/ PRESSURE, FATIGUE/WEAKNESS, HEADACHES, SWELLING. Sharon Gusman preferred language for health care discussion is english/other. Personal Protective Equipment:   Personal Protective Equipment was used including: mask-surgical and hands-gloves. Patient was placed on no precaution(s). Patient was masked. Precautions:   Patient currently on None  Patient currently roomed with door closed. Is someone accompanying this pt? No     Is the patient using any DME equipment during OV? No     Depression Screening:  3 most recent PHQ Screens 2022   Little interest or pleasure in doing things Not at all   Feeling down, depressed, irritable, or hopeless Not at all   Total Score PHQ 2 0       Learning Assessment:  Learning Assessment 2022   PRIMARY LEARNER Patient   PRIMARY LANGUAGE ENGLISH   LEARNER PREFERENCE PRIMARY DEMONSTRATION   ANSWERED BY patient   RELATIONSHIP SELF       Abuse Screening:  Abuse Screening Questionnaire 2022   Do you ever feel afraid of your partner? N   Are you in a relationship with someone who physically or mentally threatens you? N   Is it safe for you to go home? Y       Fall Risk  Fall Risk Assessment, last 12 mths 2022   Able to walk? Yes   Fall in past 12 months? 0   Do you feel unsteady? 0   Are you worried about falling 0       Pt currently taking Anticoagulant therapy? no  Pt currently taking Antiplatelet therapy? Yes. Coordination of Care:  1. Have you been to the ER, urgent care clinic since your last visit? Hospitalized since your last visit? No     2.  Have you seen or consulted any other health care providers outside of the [facility] since your last visit? Include any pap smears or colon screening. Yes. Please see Red banners under Allergies and Med Rec to remove outside inquires. All correct information has been verified with patient and added to chart.      Medication's patient's would liked removed has been marked not taking to be removed per Verbal order and read back per Charan Marie MD

## 2022-10-10 RX ORDER — LOSARTAN POTASSIUM 50 MG/1
TABLET ORAL
Qty: 90 TABLET | Refills: 2 | Status: SHIPPED | OUTPATIENT
Start: 2022-10-10

## 2022-10-13 ENCOUNTER — TELEPHONE (OUTPATIENT)
Dept: CARDIOLOGY CLINIC | Age: 67
End: 2022-10-13

## 2022-10-13 NOTE — TELEPHONE ENCOUNTER
Pt called because she recently picked up a refill for Losartan that Dr David Chan had filled when e-scripts sent him a refill request and she was wondering why because she was given the understanding that Dr David Chan had dc'd it on July 1, however did not see that in his notes. She said she had not taken this med since July 1. Told pt the best way to get her questions answered, since she does not have a appt til Nish with Dr David Chan, that before he would tell her that she could stay off of it, he would suggest that she see Maryam Staley NP for an office BP check and then too she could have her meds reviewed with Maryam Staley NP, and then find out for sure what meds she should be taking based on HR and BP in that visit because it seems like when nurse began to say other meds, pt did not seem to recognize them either. Pt did not want to make appt with Maryam Staley NP at this time. She confirmed her appt for January 6, and said she will see Dr David Chan then.

## 2023-02-01 ENCOUNTER — OFFICE VISIT (OUTPATIENT)
Dept: CARDIOLOGY CLINIC | Age: 68
End: 2023-02-01
Payer: MEDICARE

## 2023-02-01 VITALS
BODY MASS INDEX: 30.02 KG/M2 | OXYGEN SATURATION: 98 % | HEART RATE: 56 BPM | HEIGHT: 61 IN | SYSTOLIC BLOOD PRESSURE: 122 MMHG | WEIGHT: 159 LBS | DIASTOLIC BLOOD PRESSURE: 70 MMHG

## 2023-02-01 DIAGNOSIS — I25.10 CORONARY ARTERY DISEASE INVOLVING NATIVE CORONARY ARTERY OF NATIVE HEART WITHOUT ANGINA PECTORIS: Primary | ICD-10-CM

## 2023-02-01 DIAGNOSIS — I10 ESSENTIAL HYPERTENSION: ICD-10-CM

## 2023-02-01 PROCEDURE — 3017F COLORECTAL CA SCREEN DOC REV: CPT | Performed by: INTERNAL MEDICINE

## 2023-02-01 PROCEDURE — 3078F DIAST BP <80 MM HG: CPT | Performed by: INTERNAL MEDICINE

## 2023-02-01 PROCEDURE — G8510 SCR DEP NEG, NO PLAN REQD: HCPCS | Performed by: INTERNAL MEDICINE

## 2023-02-01 PROCEDURE — 93000 ELECTROCARDIOGRAM COMPLETE: CPT | Performed by: INTERNAL MEDICINE

## 2023-02-01 PROCEDURE — G8417 CALC BMI ABV UP PARAM F/U: HCPCS | Performed by: INTERNAL MEDICINE

## 2023-02-01 PROCEDURE — G8400 PT W/DXA NO RESULTS DOC: HCPCS | Performed by: INTERNAL MEDICINE

## 2023-02-01 PROCEDURE — 99214 OFFICE O/P EST MOD 30 MIN: CPT | Performed by: INTERNAL MEDICINE

## 2023-02-01 PROCEDURE — 3074F SYST BP LT 130 MM HG: CPT | Performed by: INTERNAL MEDICINE

## 2023-02-01 PROCEDURE — 1123F ACP DISCUSS/DSCN MKR DOCD: CPT | Performed by: INTERNAL MEDICINE

## 2023-02-01 PROCEDURE — 1090F PRES/ABSN URINE INCON ASSESS: CPT | Performed by: INTERNAL MEDICINE

## 2023-02-01 PROCEDURE — G8427 DOCREV CUR MEDS BY ELIG CLIN: HCPCS | Performed by: INTERNAL MEDICINE

## 2023-02-01 PROCEDURE — 1101F PT FALLS ASSESS-DOCD LE1/YR: CPT | Performed by: INTERNAL MEDICINE

## 2023-02-01 PROCEDURE — G8536 NO DOC ELDER MAL SCRN: HCPCS | Performed by: INTERNAL MEDICINE

## 2023-02-01 RX ORDER — MONTELUKAST SODIUM 10 MG/1
TABLET ORAL
COMMUNITY
Start: 2022-12-24

## 2023-02-01 NOTE — PROGRESS NOTES
HISTORY OF PRESENT ILLNESS  Sowmya Almeida  is a  78 y.o. female     ASSESSMENT and PLAN    Ms. Abhijeet Mcneal has history of CAD. In November 2018, she presented to the hospital with chest pains and NSTEMI. She had left-sided neck pain, vomiting as well as epigastric pain. Coronary angiography revealed distal left main 35%, LAD 40%, small diffusely diseased circumflex, 75% ramus intermedius with culprit lesion being ostial RPDA hazy, thrombotic 99% dissected lesion. She had total obstruction of the distal AV circumflex which was collateralized from the distal RCA. The culprit ostial RPDA lesion was stented using 2.75 mm ELIZABETH. Ejection fraction at that time revealed 45-50% with inferior, posterior hypokinesis. CAD:    Clinically stable. BP:    Well controlled at 122/70. Rhythm:    Asymptomatic sinus bradycardia at 56 bpm.  CHF:    There is no evidence of decompensated CHF noted. Weight:     Her weight today is 159 pounds. This is unchanged from previous. Her target weight is 150 pounds. Cholesterol:   Target LDL <70. Lipitor 80, Zetia 10. Anti-platelet:   Remains on ASA. I will see her back in 6 months. Thank you. Encounter Diagnoses   Name Primary?  Coronary artery disease involving native coronary artery of native heart without angina pectoris Yes    Essential hypertension      current treatment plan is effective, no change in therapy  lab results and schedule of future lab studies reviewed with patient  reviewed diet, exercise and weight control  cardiovascular risk and specific lipid/LDL goals reviewed  use of aspirin to prevent MI and TIA's discussed    Follow-up  Today, Ms. Sydney Connor has no complaints of chest pains, increased shortness of breath, or decreased exercise capacity. She is in good spirits. She states that she has been doing well without limitations. She denies any changes in her activities. She denies any orthopnea or PND.   She denies any palpitations or dizziness. Review of Systems  14 point review of system is negative unless mentioned in HPI    Physical Exam  Vitals and nursing note reviewed. Constitutional:       Appearance: Normal appearance. HENT:      Head: Normocephalic. Eyes:      Conjunctiva/sclera: Conjunctivae normal.   Neck:      Vascular: No carotid bruit. Cardiovascular:      Rate and Rhythm: Regular rhythm. Bradycardia present. Pulmonary:      Breath sounds: Normal breath sounds. Abdominal:      Palpations: Abdomen is soft. Musculoskeletal:         General: No swelling. Cervical back: No rigidity. Skin:     General: Skin is warm and dry. Neurological:      General: No focal deficit present. Mental Status: She is alert and oriented to person, place, and time. Psychiatric:         Mood and Affect: Mood normal.         Behavior: Behavior normal.          PCP: Haritha Phillips NP    Past Medical History:   Diagnosis Date    Headache     High cholesterol     Hypertension        Past Surgical History:   Procedure Laterality Date    HX OTHER SURGICAL      right fallopian tube removal    HX PARTIAL HYSTERECTOMY         Current Outpatient Medications   Medication Sig Dispense Refill    TURMERIC PO Take 500 mg by mouth daily.  montelukast (SINGULAIR) 10 mg tablet       losartan (COZAAR) 50 mg tablet TAKE 1 TABLET BY MOUTH EVERY DAY 90 Tablet 2    atorvastatin (LIPITOR) 80 mg tablet Take 1 Tablet by mouth nightly. 90 Tablet 3    triamterene-hydroCHLOROthiazide (DYAZIDE) 37.5-25 mg per capsule Take 1 Cap by mouth.  ezetimibe (ZETIA) 10 mg tablet Take 1 Tab by mouth daily. 90 Tab 1    metoprolol succinate (TOPROL-XL) 50 mg XL tablet Take 1 Tab by mouth daily. 90 Tab 3    acetaminophen (TYLENOL) 500 mg tablet Take 1 Tab by mouth every six (6) hours as needed. 30 Tab 0    aspirin 81 mg chewable tablet Take 1 Tab by mouth daily.  30 Tab 0    pantoprazole (PROTONIX) 40 mg tablet Take one tablet by mouth before breakfast and dinner daily for two days an then daily before dinner 32 Tab 0    polyethylene glycol (MIRALAX) 17 gram packet Take 1 Packet by mouth daily as needed (constipation). 30 Packet 0    venlafaxine (EFFEXOR) 37.5 mg tablet Take 35.5 mg by mouth daily.  omega 3-DHA-EPA-fish oil 1,000 mg (120 mg-180 mg) capsule Take 1 Cap by mouth daily.  Cetirizine 10 mg cap Take  by mouth. The patient has a family history of    Social History     Tobacco Use    Smoking status: Never    Smokeless tobacco: Never   Vaping Use    Vaping Use: Never used   Substance Use Topics    Alcohol use: Yes     Comment: occasional use    Drug use: No       Lab Results   Component Value Date/Time    Cholesterol, total 148 03/19/2019 01:21 PM    HDL Cholesterol 68 (H) 03/19/2019 01:21 PM    LDL, calculated 63.8 03/19/2019 01:21 PM    Triglyceride 81 03/19/2019 01:21 PM    CHOL/HDL Ratio 2.2 03/19/2019 01:21 PM        BP Readings from Last 3 Encounters:   02/01/23 122/70   07/01/22 110/68   01/18/22 (!) 156/84        Pulse Readings from Last 3 Encounters:   02/01/23 (!) 56   07/01/22 (!) 54   01/18/22 62       Wt Readings from Last 3 Encounters:   02/01/23 72.1 kg (159 lb)   07/01/22 71.9 kg (158 lb 9.6 oz)   03/10/22 75.8 kg (167 lb)         EKG: unchanged from previous tracings, sinus bradycardia.

## 2023-02-01 NOTE — PROGRESS NOTES
Hunter Hernández Nick Jacob presents today for   Chief Complaint   Patient presents with    Follow-up     6 month follow up- no complaints            Daniela Eaton preferred language for health care discussion is english/other. Is someone accompanying this pt? no    Is the patient using any DME equipment during [address]? no    Depression Screening:  3 most recent PHQ Screens 2/1/2023   Little interest or pleasure in doing things Not at all   Feeling down, depressed, irritable, or hopeless Not at all   Total Score PHQ 2 0       Learning Assessment:  Learning Assessment 1/18/2022   PRIMARY LEARNER Patient   PRIMARY LANGUAGE ENGLISH   LEARNER PREFERENCE PRIMARY DEMONSTRATION   ANSWERED BY patient   RELATIONSHIP SELF       Abuse Screening:  Abuse Screening Questionnaire 2/1/2023   Do you ever feel afraid of your partner? N   Are you in a relationship with someone who physically or mentally threatens you? N   Is it safe for you to go home? Y       Fall Risk  Fall Risk Assessment, last 12 mths 2/1/2023   Able to walk? Yes   Fall in past 12 months? 0   Do you feel unsteady? 0   Are you worried about falling 0       Pt currently taking Anticoagulant therapy? no    Coordination of Care:  1. Have you been to the ER, urgent care clinic since your last visit? Hospitalized since your last visit? no    2. Have you seen or consulted any other health care providers outside of the [address] [place] [address] since your last visit? Include any pap smears or colon screening.  no

## (undated) DEVICE — PRESSURE MONITORING SET: Brand: TRUWAVE

## (undated) DEVICE — COVER US PRB W15XL120CM W/ GEL RUBBERBAND TAPE STRP FLD GEN

## (undated) DEVICE — TREK CORONARY DILATATION CATHETER 2.50 MM X 12 MM / RAPID-EXCHANGE: Brand: TREK

## (undated) DEVICE — HI-TORQUE BALANCE GUIDE WIRE W/HYDROCOAT .014 STRAIGHT TIP 3.0 CM X 190 CM: Brand: HI-TORQUE BALANCE

## (undated) DEVICE — RADIFOCUS OPTITORQUE ANGIOGRAPHIC CATHETER: Brand: OPTITORQUE

## (undated) DEVICE — GLIDESHEATH SLENDER ACCESS KIT: Brand: GLIDESHEATH SLENDER

## (undated) DEVICE — PROCEDURE KIT FLUID MGMT 10 FR CUST MAINFOLD

## (undated) DEVICE — CATHETER GUID 6FR L100CM GRN PTFE JR4 TRUELUMEN HYBRID

## (undated) DEVICE — DRAPE,ANGIO,BRACH,STERILE,38X44: Brand: MEDLINE

## (undated) DEVICE — SET FLD ADMIN 3 W STPCOCK FIX FEM L BOR 1IN

## (undated) DEVICE — GUIDEWIRE VASC L260CM DIA0035IN TIP L3MM PTFE J STD TAPR FIX

## (undated) DEVICE — PACK PROCEDURE SURG VASC CATH 161 MMC LF